# Patient Record
Sex: MALE | Race: WHITE | Employment: UNEMPLOYED | URBAN - METROPOLITAN AREA
[De-identification: names, ages, dates, MRNs, and addresses within clinical notes are randomized per-mention and may not be internally consistent; named-entity substitution may affect disease eponyms.]

---

## 2020-12-28 ENCOUNTER — TELEPHONE (OUTPATIENT)
Dept: FAMILY MEDICINE CLINIC | Age: 50
End: 2020-12-28

## 2020-12-28 NOTE — TELEPHONE ENCOUNTER
----- Message from Cori Rodriguez sent at 12/28/2020 10:14 AM EST -----  Subject: Appointment Request    Reason for Call: New Patient Request Appointment    QUESTIONS  Type of Appointment? New Patient/New to Provider  Reason for appointment request? No appointments available during search  Additional Information for Provider? Pt is requesting to establish care   with Dr. Rodney Cruz. Pt's wife? Agustin Rose is also a patient under Dr. Rodney Cruz. Pt has open availability for appointments. ---------------------------------------------------------------------------  --------------  Livia EARLY  What is the best way for the office to contact you? OK to leave message on   voicemail  Preferred Call Back Phone Number? [phone]  ---------------------------------------------------------------------------  --------------  SCRIPT ANSWERS  Relationship to Patient? Other  Representative Name? Marc  Additional information verified (besides Name and Date of Birth)? Last 4   SSN  Appointment reason? Establish Care/Find a provider  Have you been diagnosed with   tested for   or told that you are suspected of having COVID-19 (Coronavirus)? No  Have you had a fever or taken medication to treat a fever within the past   3 days? No  Have you had a cough   shortness of breath or flu-like symptoms within the past 3 days? No  Do you currently have flu-like symptoms including fever or chills   cough   shortness of breath   or difficulty breathing   or new loss of taste or smell? No  (Service Expert  click yes below to proceed with [employer] As Usual   Scheduling)?  Yes

## 2021-08-02 ENCOUNTER — TELEPHONE (OUTPATIENT)
Dept: FAMILY MEDICINE CLINIC | Age: 51
End: 2021-08-02

## 2021-08-02 NOTE — TELEPHONE ENCOUNTER
----- Message from Michele s sent at 7/31/2021 10:58 AM EDT -----  Subject: Appointment Request    Reason for Call: New Patient Request Appointment    QUESTIONS  Type of Appointment? Established Patient  Reason for appointment request? No appointments available during search  Additional Information for Provider? Ashish Angeles had to cancel his   8/11 due to another appt. No available appts were available. Pls call to   schedule an appt. Appt was for diabetes check up.   ---------------------------------------------------------------------------  --------------  CALL BACK INFO  What is the best way for the office to contact you? OK to leave message on   voicemail  Preferred Call Back Phone Number? [phone]  ---------------------------------------------------------------------------  --------------  SCRIPT ANSWERS  Relationship to Patient? Other  Representative Name? byron  Additional information verified (besides Name and Date of Birth)? Address  Have your symptoms changed? No  Have you been diagnosed with, awaiting test results for, or told that you   are suspected of having COVID-19 (Coronavirus)? (If patient has tested   negative or was tested as a requirement for work, school, or travel and   not based on symptoms, answer no)? No  Do you currently have flu-like symptoms including fever or chills, cough,   shortness of breath, difficulty breathing, or new loss of taste or smell? No  Have you had close contact with someone with COVID-19 in the last 14 days? No  (Service Expert  click yes below to proceed with [employer] As Usual   Scheduling)?  Yes

## 2021-08-11 ENCOUNTER — OFFICE VISIT (OUTPATIENT)
Dept: FAMILY MEDICINE CLINIC | Age: 51
End: 2021-08-11
Payer: COMMERCIAL

## 2021-08-11 VITALS
DIASTOLIC BLOOD PRESSURE: 98 MMHG | WEIGHT: 315 LBS | SYSTOLIC BLOOD PRESSURE: 160 MMHG | TEMPERATURE: 97 F | BODY MASS INDEX: 44.15 KG/M2

## 2021-08-11 DIAGNOSIS — R20.2 PARESTHESIA OF FOOT, BILATERAL: ICD-10-CM

## 2021-08-11 DIAGNOSIS — R03.0 BLOOD PRESSURE ELEVATED WITHOUT HISTORY OF HTN: ICD-10-CM

## 2021-08-11 DIAGNOSIS — Z13.220 SCREENING FOR LIPID DISORDERS: ICD-10-CM

## 2021-08-11 DIAGNOSIS — B35.1 ONYCHOMYCOSIS: ICD-10-CM

## 2021-08-11 DIAGNOSIS — R73.9 HYPERGLYCEMIA: Primary | ICD-10-CM

## 2021-08-11 PROCEDURE — G8427 DOCREV CUR MEDS BY ELIG CLIN: HCPCS | Performed by: FAMILY MEDICINE

## 2021-08-11 PROCEDURE — 99204 OFFICE O/P NEW MOD 45 MIN: CPT | Performed by: FAMILY MEDICINE

## 2021-08-11 PROCEDURE — 3017F COLORECTAL CA SCREEN DOC REV: CPT | Performed by: FAMILY MEDICINE

## 2021-08-11 PROCEDURE — 4004F PT TOBACCO SCREEN RCVD TLK: CPT | Performed by: FAMILY MEDICINE

## 2021-08-11 PROCEDURE — G8419 CALC BMI OUT NRM PARAM NOF/U: HCPCS | Performed by: FAMILY MEDICINE

## 2021-08-11 RX ORDER — TERBINAFINE HYDROCHLORIDE 250 MG/1
250 TABLET ORAL DAILY
Qty: 84 TABLET | Refills: 0 | Status: SHIPPED | OUTPATIENT
Start: 2021-08-11 | End: 2021-10-01

## 2021-08-11 ASSESSMENT — ENCOUNTER SYMPTOMS
BACK PAIN: 1
DIARRHEA: 0
CHEST TIGHTNESS: 0
CONSTIPATION: 0
RHINORRHEA: 0
SHORTNESS OF BREATH: 1

## 2021-08-11 NOTE — PROGRESS NOTES
SUBJECTIVE:    Amparo Noel is a 48 y.o. male who presents as a new patient. Chief Complaint   Patient presents with    Foot Pain     Bilateral foot pain neuropathy for the past 2 months. Concerned he might have diabetes. Foot Pain   The pain is present in the left toes and right toes. This is a chronic problem. The current episode started more than 1 month ago. There has been no history of extremity trauma. The problem has been waxing and waning. The quality of the pain is described as sharp. The pain is moderate. Associated symptoms include numbness ( in base of toes but sharp pain at tips). Pertinent negatives include no limited range of motion. He has tried nothing for the symptoms. Fatigue  This is a chronic problem. The current episode started more than 1 month ago. The problem occurs daily. The problem has been waxing and waning. Associated symptoms include fatigue and numbness ( in base of toes but sharp pain at tips). Pertinent negatives include no chest pain or congestion. The symptoms are aggravated by eating. He has tried rest for the symptoms. The treatment provided mild relief. Past Medical History:   Diagnosis Date    Chronic back pain     Obesity      No past surgical history on file. Family History   Problem Relation Age of Onset    Diabetes Maternal Grandmother     Diabetes Father          in early 62s, ? cause     Social History     Tobacco Use    Smoking status: Current Every Day Smoker     Packs/day: 1.00    Smokeless tobacco: Never Used   Substance Use Topics    Alcohol use: No      No Known Allergies  No current outpatient medications on file prior to visit. No current facility-administered medications on file prior to visit. Review of Systems   Constitutional: Positive for fatigue. HENT: Negative for congestion, postnasal drip and rhinorrhea. Respiratory: Positive for shortness of breath. Negative for chest tightness.     Cardiovascular: Positive for leg swelling. Negative for chest pain and palpitations. Gastrointestinal: Negative for constipation and diarrhea. Genitourinary: Negative for difficulty urinating. Musculoskeletal: Positive for back pain (L1 fracture). Neurological: Positive for numbness ( in base of toes but sharp pain at tips). Psychiatric/Behavioral: Negative for dysphoric mood and sleep disturbance. The patient is not nervous/anxious. OBJECTIVE:    BP (!) 160/98   Temp 97 °F (36.1 °C)   Wt (!) 321 lb (145.6 kg)   BMI 44.15 kg/m²    Vitals:    08/11/21 1518 08/11/21 1540   BP: (!) 148/90 (!) 160/98   Temp: 97 °F (36.1 °C)    Weight: (!) 321 lb (145.6 kg)        Physical Exam  Constitutional:       Appearance: He is well-developed. He is obese. HENT:      Head: Normocephalic and atraumatic. Right Ear: External ear normal.      Left Ear: External ear normal.      Nose: Nose normal.   Eyes:      General:         Right eye: No discharge. Conjunctiva/sclera: Conjunctivae normal.   Neck:      Thyroid: No thyromegaly. Vascular: No JVD. Trachea: No tracheal deviation. Cardiovascular:      Rate and Rhythm: Normal rate and regular rhythm. Heart sounds: Normal heart sounds. Pulmonary:      Effort: Pulmonary effort is normal. No respiratory distress. Breath sounds: Normal breath sounds. No rales. Musculoskeletal:      Cervical back: Normal range of motion and neck supple. Right lower leg: Edema present. Left lower leg: Edema present. Lymphadenopathy:      Cervical: No cervical adenopathy. Skin:     General: Skin is warm and dry. Neurological:      Mental Status: He is alert and oriented to person, place, and time. Psychiatric:         Mood and Affect: Mood normal.         Behavior: Behavior normal.         ASSESSMENT/PLAN:    Jeremy ISAACS was seen today for foot pain.     Diagnoses and all orders for this visit:    Hyperglycemia  Patient is concerned that he may have diabetes with his pain and numbness in his feet and toes. Apparently there is a strong family history of diabetes and patient is morbidly obese. -     Hemoglobin A1C; Future    Screening for lipid disorders  -     CBC Auto Differential; Future  -     Comprehensive Metabolic Panel, Fasting; Future  -     Lipid, Fasting; Future  -     TSH with Reflex; Future    Onychomycosis  -     terbinafine (LAMISIL) 250 MG tablet; Take 1 tablet by mouth daily    Paresthesia of foot, bilateral  -     Hawa Alvarez MD (Inpatient and Outpatient EMG), [facility]  -     Vitamin B12 & Folate; Future    Blood pressure elevated without history of HTN  Have asked patient to obtain a blood pressure monitor and start checking his readings. He will be return in 4 weeks for recheck. He should bring his monitor and log of readings at that time. Due to the edema of his lower extremities will add a diuretic to his blood pressure medications if started at next visit. He is encouraged to try to lose weight. Return in about 4 weeks (around 9/8/2021). Please note portions of this note were completed with a voice recognition program.  Efforts were made to edit the dictations but occasionally words are mis-transcribed.

## 2021-08-17 ENCOUNTER — TELEPHONE (OUTPATIENT)
Dept: FAMILY MEDICINE CLINIC | Age: 51
End: 2021-08-17

## 2021-08-17 NOTE — TELEPHONE ENCOUNTER
----- Message from Elsie Mosley sent at 8/17/2021  2:05 PM EDT -----  Subject: Results Request    QUESTIONS  Which lab or imaging result is the patient calling about? BLOOD WORK   Which provider ordered the test? Malia Rojas. At what location was the test performed? [facility]  Date the test was performed? 2021-08-11  Additional Information for Provider? His blood pressure is pretty high, so   it's very important for him to be contacted. Thank you.   ---------------------------------------------------------------------------  --------------  CALL BACK INFO  What is the best way for the office to contact you? OK to leave message on   voicemail  Preferred Call Back Phone Number?  [phone]

## 2021-08-18 ENCOUNTER — PROCEDURE VISIT (OUTPATIENT)
Dept: NEUROLOGY | Age: 51
End: 2021-08-18
Payer: COMMERCIAL

## 2021-08-18 DIAGNOSIS — R20.0 BILATERAL LEG NUMBNESS: Primary | ICD-10-CM

## 2021-08-18 PROCEDURE — 95886 MUSC TEST DONE W/N TEST COMP: CPT | Performed by: PSYCHIATRY & NEUROLOGY

## 2021-08-18 PROCEDURE — 95910 NRV CNDJ TEST 7-8 STUDIES: CPT | Performed by: PSYCHIATRY & NEUROLOGY

## 2021-08-18 NOTE — PROGRESS NOTES
Christa Rodriguez M.D. [facility]) Physicians/[facility]  Board Certified in [address] [facility] [address] [facility], [address]    EMG / NERVE CONDUCTION STUDY      PATIENT:  Alicia Rueda       DATE OF EM21     YOB: 1970       REASON FOR EMG:   Numbness in both feet      REFERRING PHYSICIAN:  Jed Blanco MD  [address],  [address] [place] [address]     SUMMARY:   Bilateral peroneal motor nerve studies had slightly low conduction velocities  The right posterior tibial motor had a normal amplitude and slow conduction velocity  The left peroneal motor nerve study had a low amplitude and slow conduction velocity  Bilateral sural sensory nerve studies were normal.  However the right superficial peroneal sensory nerve study was not recordable. Needle EMG of several muscles in both lower extremities was normal.      CLINICAL DIAGNOSIS:  Peripheral neuropathy        EMG RESULTS:   This patient has a mild generalized sensorimotor mixed polyneuropathy in both lower extremities         ---------------------------------------------  Christa Rodriguez M.D.   Electromyographer / Neurologist

## 2021-08-24 RX ORDER — GABAPENTIN 100 MG/1
100 CAPSULE ORAL 3 TIMES DAILY
Qty: 90 CAPSULE | Refills: 0 | Status: SHIPPED | OUTPATIENT
Start: 2021-08-24 | End: 2021-10-01 | Stop reason: SDUPTHER

## 2021-09-10 ENCOUNTER — TELEPHONE (OUTPATIENT)
Dept: FAMILY MEDICINE CLINIC | Age: 51
End: 2021-09-10

## 2021-09-10 NOTE — TELEPHONE ENCOUNTER
----- Message from [facility] sent at 9/9/2021 10:45 AM EDT -----  Subject: Appointment Request    Reason for Call: Routine Existing Condition Follow Up    QUESTIONS  Type of Appointment? Established Patient  Reason for appointment request? No appointments available during search  Additional Information for Provider? juana has a 9/9/2021 and is needing   to reschedule. its for a 4 week f/u but the nest soonest availibility isnt   until 10/14/2021. please follow up with patient to reschedule   ---------------------------------------------------------------------------  --------------  [address]  What is the best way for the office to contact you? OK to leave message on   voicemail  Preferred Call Back Phone Number? [phone]  ---------------------------------------------------------------------------  --------------  SCRIPT ANSWERS  Relationship to Patient? Self  Have your symptoms changed? No  (Is the patient requesting to be seen urgently for their symptoms?)? No  Is this follow up request related to routine Diabetes Management? No  Are you having any new concerns about your existing condition? No  Have you been diagnosed with, awaiting test results for, or told that you   are suspected of having COVID-19 (Coronavirus)? (If patient has tested   negative or was tested as a requirement for work, school, or travel and   not based on symptoms, answer no)? No  Do you currently have flu-like symptoms including fever or chills, cough,   shortness of breath, difficulty breathing, or new loss of taste or smell? No  Have you had close contact with someone with COVID-19 in the last 14 days? No  (Service Expert  click yes below to proceed with [employer] As Usual   Scheduling)?  Yes

## 2021-09-22 ENCOUNTER — NURSE TRIAGE (OUTPATIENT)
Dept: OTHER | Facility: CLINIC | Age: 51
End: 2021-09-22

## 2021-09-22 NOTE — TELEPHONE ENCOUNTER
Received call from Vanna Puente at [facility] with Red Flag Complaint. Brief description of triage:   Back pain    Triage indicates for patient to go to the ED  Patient would still like a follow up with PCP scheduled  Care advice provided, patient verbalizes understanding; denies any other questions or concerns; instructed to call back for any new or worsening symptoms. Writer provided warm transfer to [facility] for appointment scheduling. Attention Provider: Thank you for allowing me to participate in the care of your patient. The patient was connected to triage in response to information provided to the [facility]. Please do not respond through this encounter as the response is not directed to a shared pool. Reason for Disposition   [1] SEVERE back pain (e.g., excruciating) AND [2] sudden onset AND [3] age > 61    Answer Assessment - Initial Assessment Questions  1. ONSET: \"When did the pain begin? \"       Two days ago    2. LOCATION: \"Where does it hurt? \" (upper, mid or lower back)      Mid to lower    3. SEVERITY: \"How bad is the pain? \"  (e.g., Scale 1-10; mild, moderate, or severe)    - MILD (1-3): doesn't interfere with normal activities     - MODERATE (4-7): interferes with normal activities or awakens from sleep     - SEVERE (8-10): excruciating pain, unable to do any normal activities       10    4. PATTERN: \"Is the pain constant? \" (e.g., yes, no; constant, intermittent)       Constant    5. RADIATION: \"Does the pain shoot into your legs or elsewhere? \"      Shoots into his legs    6. CAUSE:  \"What do you think is causing the back pain? \"       Wonders if it is from where he broke his back before    7. BACK OVERUSE:  Elijah Dickey recent lifting of heavy objects, strenuous work or exercise? \"      No    8. MEDICATIONS: \"What have you taken so far for the pain? \" (e.g., nothing, acetaminophen, NSAIDS)      Celebrex, Advil     9.  NEUROLOGIC SYMPTOMS: \"Do you have any weakness, numbness, or problems with bowel/bladder control? \"      No    10. OTHER SYMPTOMS: \"Do you have any other symptoms? \" (e.g., fever, abdominal pain, burning with urination, blood in urine)        No    11. PREGNANCY: \"Is there any chance you are pregnant? \" (e.g., yes, no; LMP)        n/a    Protocols used: BACK PAIN-ADULT-AH

## 2021-09-23 ENCOUNTER — APPOINTMENT (OUTPATIENT)
Dept: CT IMAGING | Age: 51
End: 2021-09-23
Payer: COMMERCIAL

## 2021-09-23 ENCOUNTER — HOSPITAL ENCOUNTER (EMERGENCY)
Age: 51
Discharge: HOME | End: 2021-09-23
Payer: COMMERCIAL

## 2021-09-23 VITALS
TEMPERATURE: 98.7 F | OXYGEN SATURATION: 95 % | HEART RATE: 63 BPM | SYSTOLIC BLOOD PRESSURE: 139 MMHG | DIASTOLIC BLOOD PRESSURE: 78 MMHG | RESPIRATION RATE: 18 BRPM

## 2021-09-23 DIAGNOSIS — N20.0 KIDNEY STONE: Primary | ICD-10-CM

## 2021-09-23 DIAGNOSIS — R10.9 FLANK PAIN: ICD-10-CM

## 2021-09-23 LAB
A/G RATIO: 1.3 (ref 1.1–2.2)
ALBUMIN SERPL-MCNC: 4 G/DL (ref 3.4–5)
ALP BLD-CCNC: 92 U/L (ref 40–129)
ALT SERPL-CCNC: 55 U/L (ref 10–40)
ANION GAP SERPL CALCULATED.3IONS-SCNC: 10 MMOL/L (ref 3–16)
AST SERPL-CCNC: 32 U/L (ref 15–37)
BASOPHILS ABSOLUTE: 0.1 K/UL (ref 0–0.2)
BASOPHILS RELATIVE PERCENT: 1 %
BILIRUB SERPL-MCNC: 0.4 MG/DL (ref 0–1)
BILIRUBIN URINE: NEGATIVE
BLOOD, URINE: ABNORMAL
BUN BLDV-MCNC: 16 MG/DL (ref 7–20)
CALCIUM SERPL-MCNC: 9 MG/DL (ref 8.3–10.6)
CHLORIDE BLD-SCNC: 100 MMOL/L (ref 99–110)
CLARITY: CLEAR
CO2: 26 MMOL/L (ref 21–32)
COLOR: ABNORMAL
CREAT SERPL-MCNC: 0.8 MG/DL (ref 0.9–1.3)
EOSINOPHILS ABSOLUTE: 0.2 K/UL (ref 0–0.6)
EOSINOPHILS RELATIVE PERCENT: 1.4 %
EPITHELIAL CELLS, UA: 1 /HPF (ref 0–5)
GFR AFRICAN AMERICAN: >60
GFR NON-AFRICAN AMERICAN: >60
GLOBULIN: 3.2 G/DL
GLUCOSE BLD-MCNC: 222 MG/DL (ref 70–99)
GLUCOSE URINE: NEGATIVE MG/DL
HCT VFR BLD CALC: 46.9 % (ref 40.5–52.5)
HEMOGLOBIN: 15.9 G/DL (ref 13.5–17.5)
HYALINE CASTS: 3 /LPF (ref 0–8)
KETONES, URINE: NEGATIVE MG/DL
LEUKOCYTE ESTERASE, URINE: NEGATIVE
LIPASE: 39 U/L (ref 13–60)
LYMPHOCYTES ABSOLUTE: 2.6 K/UL (ref 1–5.1)
LYMPHOCYTES RELATIVE PERCENT: 20 %
MCH RBC QN AUTO: 29.2 PG (ref 26–34)
MCHC RBC AUTO-ENTMCNC: 34 G/DL (ref 31–36)
MCV RBC AUTO: 85.8 FL (ref 80–100)
MICROSCOPIC EXAMINATION: YES
MONOCYTES ABSOLUTE: 0.8 K/UL (ref 0–1.3)
MONOCYTES RELATIVE PERCENT: 6.4 %
NEUTROPHILS ABSOLUTE: 9.4 K/UL (ref 1.7–7.7)
NEUTROPHILS RELATIVE PERCENT: 71.2 %
NITRITE, URINE: NEGATIVE
PDW BLD-RTO: 19.7 % (ref 12.4–15.4)
PH UA: 5.5 (ref 5–8)
PLATELET # BLD: 210 K/UL (ref 135–450)
PMV BLD AUTO: 9.7 FL (ref 5–10.5)
POTASSIUM SERPL-SCNC: 3.7 MMOL/L (ref 3.5–5.1)
PROTEIN UA: NEGATIVE MG/DL
RBC # BLD: 5.47 M/UL (ref 4.2–5.9)
RBC UA: 43 /HPF (ref 0–4)
SODIUM BLD-SCNC: 136 MMOL/L (ref 136–145)
SPECIFIC GRAVITY UA: 1.02 (ref 1–1.03)
TOTAL PROTEIN: 7.2 G/DL (ref 6.4–8.2)
URINE REFLEX TO CULTURE: ABNORMAL
URINE TYPE: ABNORMAL
UROBILINOGEN, URINE: 0.2 E.U./DL
WBC # BLD: 13.1 K/UL (ref 4–11)
WBC UA: 3 /HPF (ref 0–5)

## 2021-09-23 PROCEDURE — 36415 COLL VENOUS BLD VENIPUNCTURE: CPT

## 2021-09-23 PROCEDURE — 83690 ASSAY OF LIPASE: CPT

## 2021-09-23 PROCEDURE — 96374 THER/PROPH/DIAG INJ IV PUSH: CPT

## 2021-09-23 PROCEDURE — 74176 CT ABD & PELVIS W/O CONTRAST: CPT

## 2021-09-23 PROCEDURE — 81001 URINALYSIS AUTO W/SCOPE: CPT

## 2021-09-23 PROCEDURE — 85025 COMPLETE CBC W/AUTO DIFF WBC: CPT

## 2021-09-23 PROCEDURE — 6360000002 HC RX W HCPCS: Performed by: NURSE PRACTITIONER

## 2021-09-23 PROCEDURE — 80053 COMPREHEN METABOLIC PANEL: CPT

## 2021-09-23 PROCEDURE — 99281 EMR DPT VST MAYX REQ PHY/QHP: CPT

## 2021-09-23 RX ORDER — KETOROLAC TROMETHAMINE 30 MG/ML
15 INJECTION, SOLUTION INTRAMUSCULAR; INTRAVENOUS ONCE
Status: COMPLETED | OUTPATIENT
Start: 2021-09-23 | End: 2021-09-23

## 2021-09-23 RX ADMIN — KETOROLAC TROMETHAMINE 15 MG: 30 INJECTION, SOLUTION INTRAMUSCULAR at 02:21

## 2021-09-23 ASSESSMENT — ENCOUNTER SYMPTOMS
DIARRHEA: 0
NAUSEA: 0
SHORTNESS OF BREATH: 0
ABDOMINAL PAIN: 0
VOMITING: 0
CHEST TIGHTNESS: 0

## 2021-09-23 ASSESSMENT — PAIN SCALES - GENERAL: PAINLEVEL_OUTOF10: 8

## 2021-09-23 NOTE — ED TRIAGE NOTES
Pt states he is having severe pain in his back that comes around to the front into his abdomen. States this started a day ago but has gotten much worse.  Has also been nauseous

## 2021-09-23 NOTE — ED NOTES
Pt rating pain an 8/10, states he has had back issues before but that this feels much different.      Ruthie Braxton, RN  09/23/21 27 Juan Jose Rollins RN  09/23/21 4520

## 2021-09-23 NOTE — ED PROVIDER NOTES
results are displayed. All other labs were within normal range or not returned as of this dictation. EKG: When ordered, EKG's are interpreted by the Emergency Department Physician in the absence of a cardiologist.  Please see their note for interpretation of EKG. RADIOLOGY:   Non-plain film images such as CT, Ultrasound and MRI are read by the radiologist. Plain radiographic images are visualized and preliminarily interpreted by the ED Provider with the below findings:        Interpretation per the Radiologist below, if available at the time of this note:    CT ABDOMEN PELVIS WO CONTRAST Additional Contrast? None   Final Result   Tiny bladder calculus with no hydronephrosis. There are are also bilateral   intrarenal calculi. No results found. PROCEDURES   Unless otherwise noted below, none     Procedures    CRITICAL CARE TIME   N/A    CONSULTS:  None      EMERGENCY DEPARTMENT COURSE and DIFFERENTIAL DIAGNOSIS/MDM:   Vitals:    Vitals:    09/23/21 0149   BP: 139/78   Pulse: 63   Resp: 18   Temp: 98.7 °F (37.1 °C)   TempSrc: Oral   SpO2: 95%       Patient was given the following medications:  Medications   ketorolac (TORADOL) injection 15 mg (15 mg IntraVENous Given 9/23/21 0221)           Briefly, this is a 48year old male that presents to the emergency department with right-sided flank pain. Patient reports that the pain is severe. No mitigating exacerbating factors. Toradol was ordered with blood work and a CT to rule out acute abnormality. Urinalysis does reveal large blood. No signs of infection. CBC shows a leukocytosis white count of 13.1. CMP is unremarkable. Normal lipase. CT ABDOMEN PELVIS WO CONTRAST Additional Contrast? None (Final result)  Result time 09/23/21 02:41:31  Final result by Greg Valdivia MD (09/23/21 02:41:31)                Impression:    Tiny bladder calculus with no hydronephrosis. [place] are are also bilateral   intrarenal calculi. Does appear that the patient has passed a kidney stone. He is instructed on close outpatient follow-up and strict return precautions. Toradol was given in the ER and the patient is feeling much better. Plan to discharge home with instructions for NSAID use. I see nothing that would suggest an acute abdomen at this time. Based on history, physical exam, risk factors, and tests my suspicion for bowel obstruction, incarcerated hernia, acute pancreatitis, intra-abdominal abscess, perforated viscus, diverticulitis, cholecystitis, appendicitis, testicular torsion and cardiac ischemia is very low. There is no evidence of peritonitis, sepsis or toxicity at this time. I feel the patient can be managed as an outpatient with follow-up with his family doctor in 24-48 hours. Instructions have been given for the patient to return to the emergency department for worsening of the pain, high fevers, intractable vomiting, bleeding or any other concerns    FINAL IMPRESSION      1. Kidney stone    2.  Flank pain          DISPOSITION/PLAN   DISPOSITION Decision To Discharge 09/23/2021 03:32:34 AM      PATIENT REFERRED TO:  Stan Tony MD  [address]  [phone]    Schedule an appointment as soon as possible for a visit       Leslie Breaux MD  [facility]. [phone]    Schedule an appointment as soon as possible for a visit         DISCHARGE MEDICATIONS:  Discharge Medication List as of 9/23/2021  3:33 AM          DISCONTINUED MEDICATIONS:  Discharge Medication List as of 9/23/2021  3:33 AM                 (Please note that portions of this note were completed with a voice recognition program.  Efforts were made to edit the dictations but occasionally words are mis-transcribed.)    MARTHA Lea CNP (electronically signed)           MARTHA Lea CNP  09/23/21 1367

## 2021-10-01 ENCOUNTER — OFFICE VISIT (OUTPATIENT)
Dept: FAMILY MEDICINE CLINIC | Age: 51
End: 2021-10-01
Payer: COMMERCIAL

## 2021-10-01 VITALS
TEMPERATURE: 97.3 F | BODY MASS INDEX: 45.11 KG/M2 | WEIGHT: 315 LBS | DIASTOLIC BLOOD PRESSURE: 86 MMHG | SYSTOLIC BLOOD PRESSURE: 130 MMHG

## 2021-10-01 DIAGNOSIS — E66.01 OBESITY, MORBID (MORE THAN 100 LBS OVER IDEAL WEIGHT OR BMI > 40) (HCC): Primary | ICD-10-CM

## 2021-10-01 DIAGNOSIS — G62.9 PERIPHERAL POLYNEUROPATHY: ICD-10-CM

## 2021-10-01 DIAGNOSIS — R73.9 HYPERGLYCEMIA: ICD-10-CM

## 2021-10-01 DIAGNOSIS — G89.29 CHRONIC MIDLINE LOW BACK PAIN WITH SCIATICA, SCIATICA LATERALITY UNSPECIFIED: ICD-10-CM

## 2021-10-01 DIAGNOSIS — M54.40 CHRONIC MIDLINE LOW BACK PAIN WITH SCIATICA, SCIATICA LATERALITY UNSPECIFIED: ICD-10-CM

## 2021-10-01 DIAGNOSIS — E78.1 PURE HYPERTRIGLYCERIDEMIA: ICD-10-CM

## 2021-10-01 PROCEDURE — G8427 DOCREV CUR MEDS BY ELIG CLIN: HCPCS | Performed by: FAMILY MEDICINE

## 2021-10-01 PROCEDURE — G8484 FLU IMMUNIZE NO ADMIN: HCPCS | Performed by: FAMILY MEDICINE

## 2021-10-01 PROCEDURE — 4004F PT TOBACCO SCREEN RCVD TLK: CPT | Performed by: FAMILY MEDICINE

## 2021-10-01 PROCEDURE — G8419 CALC BMI OUT NRM PARAM NOF/U: HCPCS | Performed by: FAMILY MEDICINE

## 2021-10-01 PROCEDURE — 99213 OFFICE O/P EST LOW 20 MIN: CPT | Performed by: FAMILY MEDICINE

## 2021-10-01 PROCEDURE — 3017F COLORECTAL CA SCREEN DOC REV: CPT | Performed by: FAMILY MEDICINE

## 2021-10-01 RX ORDER — GABAPENTIN 300 MG/1
300 CAPSULE ORAL NIGHTLY
Qty: 90 CAPSULE | Refills: 1 | Status: SHIPPED | OUTPATIENT
Start: 2021-10-01 | End: 2021-12-21

## 2021-10-01 RX ORDER — GABAPENTIN 100 MG/1
100 CAPSULE ORAL 3 TIMES DAILY
Qty: 90 CAPSULE | Refills: 3 | Status: SHIPPED | OUTPATIENT
Start: 2021-10-01 | End: 2021-12-21

## 2021-10-01 ASSESSMENT — ENCOUNTER SYMPTOMS
RHINORRHEA: 0
CHEST TIGHTNESS: 0
COUGH: 0
BACK PAIN: 1
DIARRHEA: 0
SHORTNESS OF BREATH: 0
CONSTIPATION: 0

## 2021-10-01 NOTE — PROGRESS NOTES
SUBJECTIVE:    Lul Poe is a 48 y.o. male who presents for a follow up visit. Chief Complaint   Patient presents with    Follow-up     Patient is here for a follow up. Discuss lab. No new concerns. Hyperlipidemia  This is a chronic problem. The current episode started more than 1 year ago. Lipid results: Total cholesterol 132, triglycerides 287, HDL 18, LDL 57. Exacerbating diseases include obesity. Pertinent negatives include no chest pain or shortness of breath. He is currently on no antihyperlipidemic treatment. Compliance problems include adherence to diet and adherence to exercise. Risk factors for coronary artery disease include dyslipidemia, male sex, obesity and a sedentary lifestyle. Patient's medications, allergies, past medical,surgical, social and family histories were reviewed and updated as appropriate. Past Medical History:   Diagnosis Date    Chronic back pain     Hyperlipidemia     Neuropathy     Obesity      No past surgical history on file. Family History   Problem Relation Age of Onset    Diabetes Maternal Grandmother     Diabetes Father          in early 62s, ? cause     Social History     Tobacco Use    Smoking status: Current Every Day Smoker     Packs/day: 1.00    Smokeless tobacco: Never Used   Substance Use Topics    Alcohol use: No      No Known Allergies  No current outpatient medications on file prior to visit. No current facility-administered medications on file prior to visit. Review of Systems   Constitutional: Negative for activity change, fatigue and unexpected weight change. HENT: Negative for congestion, postnasal drip and rhinorrhea. Respiratory: Negative for cough, chest tightness and shortness of breath. Cardiovascular: Negative for chest pain. Gastrointestinal: Negative for constipation and diarrhea. Genitourinary: Negative for difficulty urinating. Musculoskeletal: Positive for back pain. Psychiatric/Behavioral: Negative for sleep disturbance. OBJECTIVE:    /86   Temp 97.3 °F (36.3 °C)   Wt (!) 328 lb (148.8 kg)   BMI 45.11 kg/m²    Physical Exam  Constitutional:       Appearance: He is well-developed. He is obese. HENT:      Head: Normocephalic and atraumatic. Right Ear: Tympanic membrane and external ear normal.      Left Ear: Tympanic membrane and external ear normal.      Nose: Nose normal.      Mouth/Throat:      Mouth: Mucous membranes are moist.      Pharynx: No posterior oropharyngeal erythema. Eyes:      General:         Right eye: No discharge. Conjunctiva/sclera: Conjunctivae normal.   Neck:      Thyroid: No thyromegaly. Vascular: No JVD. Trachea: No tracheal deviation. Cardiovascular:      Rate and Rhythm: Normal rate and regular rhythm. Heart sounds: Normal heart sounds. Pulmonary:      Effort: Pulmonary effort is normal. No respiratory distress. Breath sounds: Normal breath sounds. No rales. Musculoskeletal:      Cervical back: Normal range of motion and neck supple. Lymphadenopathy:      Cervical: No cervical adenopathy. Skin:     General: Skin is warm and dry. Neurological:      Mental Status: He is alert and oriented to person, place, and time. Psychiatric:         Mood and Affect: Mood normal.         Behavior: Behavior normal.         ASSESSMENT/PLAN:    Adam Carmona was seen today for follow-up. Diagnoses and all orders for this visit:    Obesity, morbid (more than 100 lbs over ideal weight or BMI > 40) ([facility])  Today's BMI is 45.11 kg/m²    Peripheral polyneuropathy  Symptoms are improved with the start of gabapentin. We will go ahead and try to increase the dose.  -     gabapentin (NEURONTIN) 100 MG capsule; Take 1 capsule by mouth 3 times daily for 30 days. Intended supply: 90 days  -     gabapentin (NEURONTIN) 300 MG capsule; Take 1 capsule by mouth nightly for 90 days.     Pure hypertriglyceridemia  - Comprehensive Metabolic Panel, Fasting; Future  -     Lipid, Fasting; Future    Hyperglycemia  Hemoglobin A1c in August was 6.3  -     Hemoglobin A1C; Future    Chronic midline low back pain with sciatica, sciatica laterality unspecified  -     gabapentin (NEURONTIN) 300 MG capsule; Take 1 capsule by mouth nightly for 90 days. Return in about 6 months (around 4/1/2022) for follow up of hyperlipidemia. Please note portions of this note were completed with a voicerecognition program.  Efforts were made to edit the dictations but occasionally words are mis-transcribed.

## 2021-10-01 NOTE — PATIENT INSTRUCTIONS
Patient Education        Learning About Carbohydrate (Carb) Counting and Eating Out When You Have Diabetes  Why plan your meals? Meal planning can be a key part of managing diabetes. Planning meals and snacks with the right balance of carbohydrate, protein, and fat can help you keep your blood sugar at the target level you set with your doctor. You don't have to eat special foods. You can eat what your family eats, including sweets once in a while. But you do have to pay attention to how often you eat and how much you eat of certain foods. You may want to work with a dietitian or a certified diabetes educator. He or she can give you tips and meal ideas and can answer your questions about meal planning. This health professional can also help you reach a healthy weight if that is one of your goals. What should you know about eating carbs? Managing the amount of carbohydrate (carbs) you eat is an important part of healthy meals when you have diabetes. Carbohydrate is found in many foods. · Learn which foods have carbs. And learn the amounts of carbs in different foods. ? Bread, cereal, pasta, and rice have about 15 grams of carbs in a serving. A serving is 1 slice of bread (1 ounce), ½ cup of cooked cereal, or 1/3 cup of cooked pasta or rice. ? Fruits have 15 grams of carbs in a serving. A serving is 1 small fresh fruit, such as an apple or orange; ½ of a banana; ½ cup of cooked or canned fruit; ½ cup of fruit juice; 1 cup of melon or raspberries; or 2 tablespoons of dried fruit. ? Milk and no-sugar-added yogurt have 15 grams of carbs in a serving. A serving is 1 cup of milk or 2/3 cup of no-sugar-added yogurt. ? Starchy vegetables have 15 grams of carbs in a serving. A serving is ½ cup of mashed potatoes or sweet potato; 1 cup winter squash; ½ of a small baked potato; ½ cup of cooked beans; or ½ cup cooked corn or green peas.   · Learn how much carbs to eat each day and at each meal. A dietitian or CDE can teach you how to keep track of the amount of carbs you eat. This is called carbohydrate counting. · If you are not sure how to count carbohydrate grams, use the Plate Method to plan meals. It is a good, quick way to make sure that you have a balanced meal. It also helps you spread carbs throughout the day. ? Divide your plate by types of foods. Put non-starchy vegetables on half the plate, meat or other protein food on one-quarter of the plate, and a grain or starchy vegetable in the final quarter of the plate. To this you can add a small piece of fruit and 1 cup of milk or yogurt, depending on how many carbs you are supposed to eat at a meal.  · Try to eat about the same amount of carbs at each meal. Do not \"save up\" your daily allowance of carbs to eat at one meal.  · Proteins have very little or no carbs per serving. Examples of proteins are beef, chicken, turkey, fish, eggs, tofu, cheese, cottage cheese, and peanut butter. A serving size of meat is 3 ounces, which is about the size of a deck of cards. Examples of meat substitute serving sizes (equal to 1 ounce of meat) are 1/4 cup of cottage cheese, 1 egg, 1 tablespoon of peanut butter, and ½ cup of tofu. How can you eat out and still eat healthy? · Learn to estimate the serving sizes of foods that have carbohydrate. If you measure food at home, it will be easier to estimate the amount in a serving of restaurant food. · If the meal you order has too much carbohydrate (such as potatoes, corn, or baked beans), ask to have a low-carbohydrate food instead. Ask for a salad or green vegetables. · If you use insulin, check your blood sugar before and after eating out to help you plan how much to eat in the future. · If you eat more carbohydrate at a meal than you had planned, take a walk or do other exercise. This will help lower your blood sugar. What are some tips for eating healthy? · Limit saturated fat, such as the fat from meat and dairy products. This is a healthy choice because people who have diabetes are at higher risk of heart disease. So choose lean cuts of meat and nonfat or low-fat dairy products. Use olive or canola oil instead of butter or shortening when cooking. · Don't skip meals. Your blood sugar may drop too low if you skip meals and take insulin or certain medicines for diabetes. · Check with your doctor before you drink alcohol. Alcohol can cause your blood sugar to drop too low. Alcohol can also cause a bad reaction if you take certain diabetes medicines. Follow-up care is a key part of your treatment and safety. Be sure to make and go to all appointments, and call your doctor if you are having problems. It's also a good idea to know your test results and keep a list of the medicines you take. Where can you learn more? Go to https://AlaMarkacarlie.Speedshape. org and sign in to your [employer] account. Enter X979 in the [employer] box to learn more about \"Learning About Carbohydrate (Carb) Counting and Eating Out When You Have Diabetes. \"     If you do not have an account, please click on the \"Sign Up Now\" link. Current as of: December 17, 2020               Content Version: 13.0  © 2006-2021 Healthwise, [employer]. Care instructions adapted under license by [facility] ([facility]). If you have questions about a medical condition or this instruction, always ask your healthcare professional. [place] [address] any warranty or liability for your use of this information. Patient Education        Counting Carbohydrates for Diabetes: Care Instructions  Your Care Instructions     You don't have to eat special foods when you have diabetes. You just have to be careful to eat healthy foods. Carbohydrates (carbs) raise blood sugar higher and quicker than any other nutrient. Carbs are found in desserts, breads and cereals, and fruit. They're also in starchy vegetables.  These include potatoes, corn, and grains such as rice and pasta. Carbs are also in milk and yogurt. The more carbs you eat at one time, the higher your blood sugar will rise. Spreading carbs all through the day helps keep your blood sugar levels within your target range. Counting carbs is one of the best ways to keep your blood sugar under control. If you use insulin, counting carbs helps you match the right amount of insulin to the number of grams of carbs in a meal. Then you can change your diet and insulin dose as needed. Testing your blood sugar several times a day can help you learn how carbs affect your blood sugar. A registered dietitian or certified diabetes educator can help you plan meals and snacks. Follow-up care is a key part of your treatment and safety. Be sure to make and go to all appointments, and call your doctor if you are having problems. It's also a good idea to know your test results and keep a list of the medicines you take. How can you care for yourself at home? Know your daily amount of carbohydrates  Your daily amount depends on several things, such as your weight, how active you are, which diabetes medicines you take, and what your goals are for your blood sugar levels. A registered dietitian or certified diabetes educator can help you plan how many carbs to include in each meal and snack. For most adults, a guideline for the daily amount of carbs is:  · 45 to 60 grams at each meal. That's about the same as 3 to 4 carbohydrate servings. · 15 to 20 grams at each snack. That's about the same as 1 carbohydrate serving. Count carbs  Counting carbs lets you know how much rapid-acting insulin to take before you eat. If you use an insulin pump, you get a constant rate of insulin during the day. So the pump must be programmed at meals. This gives you extra insulin to cover the rise in blood sugar after meals. If you take insulin:  · Learn your own insulin-to-carb ratio.  You and your diabetes health professional will figure out the ratio. You can do this by testing your blood sugar after meals. For example, you may need a certain amount of insulin for every 15 grams of carbs. · Add up the carb grams in a meal. Then you can figure out how many units of insulin to take based on your insulin-to-carb ratio. · Exercise lowers blood sugar. You can use less insulin than you would if you were not doing exercise. Keep in mind that timing matters. If you exercise within 1 hour after a meal, your body may need less insulin for that meal than it would if you exercised 3 hours after the meal. Test your blood sugar to find out how exercise affects your need for insulin. If you do or don't take insulin:  · Look at labels on packaged foods. This can tell you how many carbs are in a serving. You can also use guides from the American Diabetes Association. · Be aware of portions, or serving sizes. If a package has two servings and you eat the whole package, you need to double the number of grams of carbohydrate listed for one serving. · Protein, fat, and fiber do not raise blood sugar as much as carbs do. If you eat a lot of these nutrients in a meal, your blood sugar will rise more slowly than it would otherwise. Eat from all food groups  · Eat at least three meals a day. · Plan meals to include food from all the food groups. The food groups include grains, fruits, dairy, proteins, and vegetables. · Talk to your dietitian or diabetes educator about ways to add limited amounts of sweets into your meal plan. · If you drink alcohol, talk to your doctor. It may not be recommended when you are taking certain diabetes medicines. Where can you learn more? Go to https://Stadiuscarlie.Fashiontrot. org and sign in to your [employer] account. Enter S011 in the [facility] box to learn more about \"Counting Carbohydrates for Diabetes: Care Instructions. \"     If you do not have an account, please click on the \"Sign Up Now\" link.   Current as of: August 31, 2020               Content Version: 13.0  © 2055-9086 Healthwise, Incorporated. Care instructions adapted under license by [facility] [place] ([facility]). If you have questions about a medical condition or this instruction, always ask your healthcare professional. [address] any warranty or liability for your use of this information.

## 2021-10-04 ENCOUNTER — TELEPHONE (OUTPATIENT)
Dept: FAMILY MEDICINE CLINIC | Age: 51
End: 2021-10-04

## 2021-10-04 NOTE — TELEPHONE ENCOUNTER
----- Message from Charlene Elias 12 sent at 10/2/2021  2:11 PM EDT -----  Subject: Message to Provider    QUESTIONS  Information for Provider? Patient wife is calling in to see if you irving   received patients medical records. She requested them 3 weeks ago to be   sent over to Dr. Cynthia Romero office. Please call her back to advise   ---------------------------------------------------------------------------  --------------  CALL BACK INFO  What is the best way for the office to contact you? OK to leave message on   voicemail  Preferred Call Back Phone Number? [phone]  ---------------------------------------------------------------------------  --------------  SCRIPT ANSWERS  Relationship to Patient? Other  Representative Name? Marc   Is the Representative on the appropriate HIPAA document in Epic?  Yes

## 2021-12-21 ENCOUNTER — TELEPHONE (OUTPATIENT)
Dept: FAMILY MEDICINE CLINIC | Age: 51
End: 2021-12-21

## 2021-12-21 DIAGNOSIS — G62.9 NEUROPATHY: Primary | ICD-10-CM

## 2021-12-21 RX ORDER — GABAPENTIN 300 MG/1
300 CAPSULE ORAL 3 TIMES DAILY
Qty: 180 CAPSULE | Refills: 2 | Status: SHIPPED | OUTPATIENT
Start: 2021-12-21 | End: 2022-02-07 | Stop reason: SDUPTHER

## 2021-12-21 NOTE — TELEPHONE ENCOUNTER
Patient's wife is calling because the patient is in a lot of pain because of his neuropathy and he has been taking his gabapentin more than he is supposed to so they are wanting to know if  is okay with sending the patient a refill in to last him until the full prescription is due.    dany in chart       Please advise

## 2021-12-21 NOTE — TELEPHONE ENCOUNTER
Refill at a dose of 300 mg 3 times a day is sent to his pharmacy. He may want to start twice daily for a week or 2 and then go to 3 times daily.

## 2022-01-20 DIAGNOSIS — B35.1 ONYCHOMYCOSIS: ICD-10-CM

## 2022-01-20 RX ORDER — TERBINAFINE HYDROCHLORIDE 250 MG/1
TABLET ORAL
Qty: 84 TABLET | Refills: 0 | Status: SHIPPED | OUTPATIENT
Start: 2022-01-20 | End: 2022-03-09

## 2022-01-20 NOTE — TELEPHONE ENCOUNTER
Pharmacy asking for a refill on   terbinafine (LAMISIL) 250 MG tablet     The original prescription was discontinued on 10/1/2021 by Juliana Magana LPN. Renewing this prescription may not be appropriate. Is refill appropriate. Please and send to pharmacy if appropriate.      Recent Visits  Date Type Provider Dept   10/01/21 Office Visit MD [facility]   08/11/21 Office Visit MD [facility] recent visits within past 540 days with a meds authorizing provider and meeting all other requirements  Future Appointments  Date Type Provider Dept   04/01/22 Appointment MD [facility]   Showing future appointments within next 150 days with a meds authorizing provider and meeting all other requirements

## 2022-02-07 DIAGNOSIS — G62.9 NEUROPATHY: ICD-10-CM

## 2022-02-07 RX ORDER — GABAPENTIN 300 MG/1
300 CAPSULE ORAL 3 TIMES DAILY
Qty: 90 CAPSULE | Refills: 0 | Status: SHIPPED | OUTPATIENT
Start: 2022-02-07 | End: 2022-03-09 | Stop reason: SDUPTHER
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
## 2022-02-07 NOTE — TELEPHONE ENCOUNTER
Medication:   Requested Prescriptions     Pending Prescriptions Disp Refills    gabapentin (NEURONTIN) 300 MG capsule 180 capsule 2     Sig: Take 1 capsule by mouth 3 times daily for 90 days. Last Filled:      Patient Phone Number: [phone] (home)     Last appt: 10/1/2021   Next appt: 4/1/2022    Last OARRS: No flowsheet data found.   PDMP Monitoring:    Last PDMP Narvis Neat as Reviewed [facility]):  Review User Review Instant Review Result          Preferred Pharmacy:   [facility] [address] [facility], [address] [facility] [address] [phone]  [address]  [address] [place], [place] [id number]  Phone: [phone] Fax: [phone]    [facility] [address], [address] [facility] [phone] [place] [phone]  [address]  [address] [place] [address]  Phone: [phone] Fax: [phone]

## 2022-03-09 ENCOUNTER — OFFICE VISIT (OUTPATIENT)
Dept: FAMILY MEDICINE CLINIC | Age: 52
End: 2022-03-09
Payer: COMMERCIAL

## 2022-03-09 VITALS
HEIGHT: 72 IN | SYSTOLIC BLOOD PRESSURE: 170 MMHG | WEIGHT: 315 LBS | BODY MASS INDEX: 42.66 KG/M2 | DIASTOLIC BLOOD PRESSURE: 100 MMHG

## 2022-03-09 DIAGNOSIS — E66.01 OBESITY, MORBID (MORE THAN 100 LBS OVER IDEAL WEIGHT OR BMI > 40) (HCC): Primary | ICD-10-CM

## 2022-03-09 DIAGNOSIS — M54.50 LUMBAR PAIN: ICD-10-CM

## 2022-03-09 DIAGNOSIS — G62.9 NEUROPATHY: ICD-10-CM

## 2022-03-09 DIAGNOSIS — R53.83 FATIGUE, UNSPECIFIED TYPE: ICD-10-CM

## 2022-03-09 DIAGNOSIS — B35.1 ONYCHOMYCOSIS: ICD-10-CM

## 2022-03-09 DIAGNOSIS — R06.83 SNORING: ICD-10-CM

## 2022-03-09 DIAGNOSIS — I10 PRIMARY HYPERTENSION: ICD-10-CM

## 2022-03-09 PROCEDURE — 3017F COLORECTAL CA SCREEN DOC REV: CPT | Performed by: FAMILY MEDICINE

## 2022-03-09 PROCEDURE — G8417 CALC BMI ABV UP PARAM F/U: HCPCS | Performed by: FAMILY MEDICINE

## 2022-03-09 PROCEDURE — 4004F PT TOBACCO SCREEN RCVD TLK: CPT | Performed by: FAMILY MEDICINE

## 2022-03-09 PROCEDURE — 99214 OFFICE O/P EST MOD 30 MIN: CPT | Performed by: FAMILY MEDICINE

## 2022-03-09 PROCEDURE — G8484 FLU IMMUNIZE NO ADMIN: HCPCS | Performed by: FAMILY MEDICINE

## 2022-03-09 PROCEDURE — G8427 DOCREV CUR MEDS BY ELIG CLIN: HCPCS | Performed by: FAMILY MEDICINE

## 2022-03-09 RX ORDER — LISINOPRIL 10 MG/1
10 TABLET ORAL DAILY
Qty: 30 TABLET | Refills: 5 | Status: SHIPPED | OUTPATIENT
Start: 2022-03-09 | End: 2022-07-28

## 2022-03-09 RX ORDER — GABAPENTIN 300 MG/1
300 CAPSULE ORAL 3 TIMES DAILY
Qty: 90 CAPSULE | Refills: 0 | Status: SHIPPED | OUTPATIENT
Start: 2022-03-09 | End: 2022-04-01 | Stop reason: SDUPTHER

## 2022-03-09 RX ORDER — TERBINAFINE HYDROCHLORIDE 250 MG/1
TABLET ORAL
Qty: 84 TABLET | Refills: 0 | Status: SHIPPED | OUTPATIENT
Start: 2022-03-09 | End: 2022-07-28

## 2022-03-09 ASSESSMENT — PATIENT HEALTH QUESTIONNAIRE - PHQ9
2. FEELING DOWN, DEPRESSED OR HOPELESS: 0
SUM OF ALL RESPONSES TO PHQ QUESTIONS 1-9: 0
SUM OF ALL RESPONSES TO PHQ9 QUESTIONS 1 & 2: 0
1. LITTLE INTEREST OR PLEASURE IN DOING THINGS: 0
SUM OF ALL RESPONSES TO PHQ QUESTIONS 1-9: 0

## 2022-03-09 ASSESSMENT — ENCOUNTER SYMPTOMS
BACK PAIN: 1
SHORTNESS OF BREATH: 1
RHINORRHEA: 0
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
## 2022-03-14 ENCOUNTER — OFFICE VISIT (OUTPATIENT)
Dept: ORTHOPEDIC SURGERY | Age: 52
End: 2022-03-14
Payer: COMMERCIAL

## 2022-03-14 VITALS — BODY MASS INDEX: 44.1 KG/M2 | WEIGHT: 315 LBS | HEIGHT: 71 IN

## 2022-03-14 DIAGNOSIS — M51.36 DDD (DEGENERATIVE DISC DISEASE), LUMBAR: ICD-10-CM

## 2022-03-14 DIAGNOSIS — Z87.81 HISTORY OF VERTEBRAL FRACTURE: ICD-10-CM

## 2022-03-14 DIAGNOSIS — M47.816 LUMBAR SPONDYLOSIS: ICD-10-CM

## 2022-03-14 DIAGNOSIS — M48.062 SPINAL STENOSIS OF LUMBAR REGION WITH NEUROGENIC CLAUDICATION: ICD-10-CM

## 2022-03-14 DIAGNOSIS — M54.50 LUMBAR PAIN: ICD-10-CM

## 2022-03-14 PROCEDURE — 99204 OFFICE O/P NEW MOD 45 MIN: CPT | Performed by: INTERNAL MEDICINE

## 2022-03-14 PROCEDURE — G8484 FLU IMMUNIZE NO ADMIN: HCPCS | Performed by: INTERNAL MEDICINE

## 2022-03-14 PROCEDURE — 4004F PT TOBACCO SCREEN RCVD TLK: CPT | Performed by: INTERNAL MEDICINE

## 2022-03-14 PROCEDURE — 3017F COLORECTAL CA SCREEN DOC REV: CPT | Performed by: INTERNAL MEDICINE

## 2022-03-14 PROCEDURE — G8427 DOCREV CUR MEDS BY ELIG CLIN: HCPCS | Performed by: INTERNAL MEDICINE

## 2022-03-14 PROCEDURE — G8417 CALC BMI ABV UP PARAM F/U: HCPCS | Performed by: INTERNAL MEDICINE

## 2022-03-14 RX ORDER — CYCLOBENZAPRINE HCL 10 MG
10 TABLET ORAL NIGHTLY PRN
Qty: 30 TABLET | Refills: 1 | Status: SHIPPED | OUTPATIENT
Start: 2022-03-14 | End: 2022-04-01 | Stop reason: SDUPTHER

## 2022-03-14 RX ORDER — TRAMADOL HYDROCHLORIDE 50 MG/1
50 TABLET ORAL EVERY 6 HOURS PRN
Qty: 21 TABLET | Refills: 0 | Status: SHIPPED
Start: 2022-03-14 | End: 2022-03-15

## 2022-03-14 RX ORDER — PREDNISONE 50 MG/1
50 TABLET ORAL DAILY
Qty: 6 TABLET | Refills: 0 | Status: SHIPPED | OUTPATIENT
Start: 2022-03-14 | End: 2022-03-20
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
## 2022-03-15 DIAGNOSIS — Z87.81 HISTORY OF VERTEBRAL FRACTURE: Primary | ICD-10-CM

## 2022-03-15 RX ORDER — OXYCODONE HYDROCHLORIDE AND ACETAMINOPHEN 5; 325 MG/1; MG/1
1 TABLET ORAL EVERY 6 HOURS PRN
Qty: 20 TABLET | Refills: 0 | Status: SHIPPED | OUTPATIENT
Start: 2022-03-15 | End: 2022-03-20

## 2022-04-01 ENCOUNTER — OFFICE VISIT (OUTPATIENT)
Dept: FAMILY MEDICINE CLINIC | Age: 52
End: 2022-04-01
Payer: COMMERCIAL

## 2022-04-01 VITALS
SYSTOLIC BLOOD PRESSURE: 148 MMHG | DIASTOLIC BLOOD PRESSURE: 104 MMHG | WEIGHT: 315 LBS | TEMPERATURE: 97.2 F | BODY MASS INDEX: 44.84 KG/M2

## 2022-04-01 DIAGNOSIS — G89.29 CHRONIC BILATERAL LOW BACK PAIN WITH SCIATICA, SCIATICA LATERALITY UNSPECIFIED: Primary | ICD-10-CM

## 2022-04-01 DIAGNOSIS — I10 PRIMARY HYPERTENSION: ICD-10-CM

## 2022-04-01 DIAGNOSIS — M54.40 CHRONIC BILATERAL LOW BACK PAIN WITH SCIATICA, SCIATICA LATERALITY UNSPECIFIED: Primary | ICD-10-CM

## 2022-04-01 DIAGNOSIS — G62.9 NEUROPATHY: ICD-10-CM

## 2022-04-01 PROCEDURE — G8417 CALC BMI ABV UP PARAM F/U: HCPCS | Performed by: FAMILY MEDICINE

## 2022-04-01 PROCEDURE — G8427 DOCREV CUR MEDS BY ELIG CLIN: HCPCS | Performed by: FAMILY MEDICINE

## 2022-04-01 PROCEDURE — 3017F COLORECTAL CA SCREEN DOC REV: CPT | Performed by: FAMILY MEDICINE

## 2022-04-01 PROCEDURE — 4004F PT TOBACCO SCREEN RCVD TLK: CPT | Performed by: FAMILY MEDICINE

## 2022-04-01 PROCEDURE — 99213 OFFICE O/P EST LOW 20 MIN: CPT | Performed by: FAMILY MEDICINE

## 2022-04-01 RX ORDER — ACETAMINOPHEN, ASPIRIN AND CAFFEINE 250; 250; 65 MG/1; MG/1; MG/1
TABLET, FILM COATED ORAL
COMMUNITY

## 2022-04-01 RX ORDER — GABAPENTIN 300 MG/1
300 CAPSULE ORAL 3 TIMES DAILY
Qty: 180 CAPSULE | Refills: 1 | Status: SHIPPED | OUTPATIENT
Start: 2022-04-01 | End: 2022-07-28

## 2022-04-01 RX ORDER — CYCLOBENZAPRINE HCL 10 MG
10 TABLET ORAL NIGHTLY PRN
Qty: 90 TABLET | Refills: 1 | Status: SHIPPED | OUTPATIENT
Start: 2022-04-01 | End: 2022-05-24 | Stop reason: SDUPTHER

## 2022-04-01 RX ORDER — AMLODIPINE BESYLATE 5 MG/1
5 TABLET ORAL DAILY
Qty: 30 TABLET | Refills: 3 | Status: SHIPPED | OUTPATIENT
Start: 2022-04-01 | End: 2022-07-28

## 2022-04-01 ASSESSMENT — ENCOUNTER SYMPTOMS
CHEST TIGHTNESS: 0
SHORTNESS OF BREATH: 0
BACK PAIN: 1

## 2022-04-01 NOTE — PROGRESS NOTES
SUBJECTIVE:    Miko Awan is a 46 y.o. male who presents for a follow up visit. Chief Complaint   Patient presents with    6 Month Follow-Up     No lab         Hypertension  This is a chronic problem. The current episode started more than 1 year ago. The problem is uncontrolled. Associated symptoms include headaches ( using OTC Excederine). Pertinent negatives include no chest pain or shortness of breath. Agents associated with hypertension include NSAIDs. Risk factors for coronary artery disease include male gender, obesity and sedentary lifestyle. Past treatments include ACE inhibitors. The current treatment provides mild improvement. Compliance problems include exercise and diet. Back Pain  This is a chronic problem. The current episode started more than 1 year ago. The problem has been waxing and waning since onset. The pain is present in the lumbar spine. The quality of the pain is described as aching. The pain radiates to the right thigh. The pain is moderate. The symptoms are aggravated by standing and bending. Associated symptoms include headaches ( using OTC Excederine). Pertinent negatives include no chest pain. Risk factors include obesity, poor posture and lack of exercise. He has tried ice, heat, NSAIDs and home exercises for the symptoms. The treatment provided significant relief. Patient's medications, allergies, past medical,surgical, social and family histories were reviewed and updated as appropriate. Past Medical History:   Diagnosis Date    Chronic back pain     Hyperlipidemia     Neuropathy     Obesity      No past surgical history on file.   Family History   Problem Relation Age of Onset    Diabetes Maternal Grandmother     Diabetes Father          in early 62s, ? cause     Social History     Tobacco Use    Smoking status: Current Every Day Smoker     Packs/day: 1.00    Smokeless tobacco: Never Used   Substance Use Topics    Alcohol use: No      No Known Allergies  Current Outpatient Medications on File Prior to Visit   Medication Sig Dispense Refill    aspirin-acetaminophen-caffeine (EXCEDRIN MIGRAINE) 250-250-65 MG per tablet Take by mouth As needed for HA      lisinopril (PRINIVIL;ZESTRIL) 10 MG tablet Take 1 tablet by mouth daily 30 tablet 5    terbinafine (LAMISIL) 250 MG tablet TAKE ONE TABLET BY MOUTH DAILY 84 tablet 0     No current facility-administered medications on file prior to visit. Review of Systems   Respiratory: Negative for chest tightness and shortness of breath. Cardiovascular: Negative for chest pain and leg swelling. Musculoskeletal: Positive for back pain. Neurological: Positive for headaches ( using OTC Excederine). Negative for dizziness and light-headedness. OBJECTIVE:    BP (!) 148/104   Temp 97.2 °F (36.2 °C)   Wt (!) 326 lb (147.9 kg)   BMI 44.84 kg/m²    Vitals:    04/01/22 1001 04/01/22 1033   BP: (!) 150/98 (!) 148/104   Temp: 97.2 °F (36.2 °C)    Weight: (!) 326 lb (147.9 kg)        Physical Exam  Constitutional:       Appearance: He is well-developed. HENT:      Head: Normocephalic and atraumatic. Right Ear: External ear normal.      Left Ear: External ear normal.      Nose: Nose normal.   Eyes:      General:         Right eye: No discharge. Conjunctiva/sclera: Conjunctivae normal.   Neck:      Thyroid: No thyromegaly. Vascular: No JVD. Trachea: No tracheal deviation. Cardiovascular:      Rate and Rhythm: Normal rate and regular rhythm. Heart sounds: Normal heart sounds. Pulmonary:      Effort: Pulmonary effort is normal. No respiratory distress. Breath sounds: Normal breath sounds. No rales. Musculoskeletal:      Cervical back: Normal range of motion and neck supple. Lymphadenopathy:      Cervical: No cervical adenopathy. Skin:     General: Skin is warm and dry. Neurological:      Mental Status: He is alert and oriented to person, place, and time. ASSESSMENT/PLAN:    Jesusita Newton was seen today for 6 month follow-up. Diagnoses and all orders for this visit:    Chronic bilateral low back pain with sciatica, sciatica laterality unspecified  -     cyclobenzaprine (FLEXERIL) 10 MG tablet; Take 1 tablet by mouth nightly as needed for Muscle spasms    Neuropathy  -     gabapentin (NEURONTIN) 300 MG capsule; Take 1 capsule by mouth 3 times daily for 30 days. Primary hypertension  Needing better control. We will continue lisinopril 10 mg daily and add amlodipine. -     amLODIPine (NORVASC) 5 MG tablet; Take 1 tablet by mouth daily        Return in about 6 weeks (around 5/13/2022). Please note portions of this note were completed with a voicerecognition program.  Efforts were made to edit the dictations but occasionally words are mis-transcribed.

## 2022-04-06 ENCOUNTER — TELEPHONE (OUTPATIENT)
Dept: ORTHOPEDIC SURGERY | Age: 52
End: 2022-04-06

## 2022-04-06 ENCOUNTER — OFFICE VISIT (OUTPATIENT)
Dept: ORTHOPEDIC SURGERY | Age: 52
End: 2022-04-06
Payer: COMMERCIAL

## 2022-04-06 VITALS — HEIGHT: 75 IN | BODY MASS INDEX: 39.17 KG/M2 | WEIGHT: 315 LBS

## 2022-04-06 DIAGNOSIS — M47.816 LUMBAR SPONDYLOSIS: ICD-10-CM

## 2022-04-06 DIAGNOSIS — M51.27 HERNIATION OF INTERVERTEBRAL DISC BETWEEN L5 AND S1: Primary | ICD-10-CM

## 2022-04-06 DIAGNOSIS — M51.36 DDD (DEGENERATIVE DISC DISEASE), LUMBAR: ICD-10-CM

## 2022-04-06 DIAGNOSIS — Z87.81 HISTORY OF VERTEBRAL FRACTURE: ICD-10-CM

## 2022-04-06 PROCEDURE — G8427 DOCREV CUR MEDS BY ELIG CLIN: HCPCS | Performed by: INTERNAL MEDICINE

## 2022-04-06 PROCEDURE — 3017F COLORECTAL CA SCREEN DOC REV: CPT | Performed by: INTERNAL MEDICINE

## 2022-04-06 PROCEDURE — 99214 OFFICE O/P EST MOD 30 MIN: CPT | Performed by: INTERNAL MEDICINE

## 2022-04-06 PROCEDURE — G8417 CALC BMI ABV UP PARAM F/U: HCPCS | Performed by: INTERNAL MEDICINE

## 2022-04-06 PROCEDURE — 4004F PT TOBACCO SCREEN RCVD TLK: CPT | Performed by: INTERNAL MEDICINE

## 2022-04-06 RX ORDER — HYDROCODONE BITARTRATE AND ACETAMINOPHEN 5; 325 MG/1; MG/1
1 TABLET ORAL EVERY 6 HOURS PRN
Qty: 20 TABLET | Refills: 0 | Status: SHIPPED | OUTPATIENT
Start: 2022-04-06 | End: 2022-04-06 | Stop reason: SDUPTHER

## 2022-04-06 RX ORDER — HYDROCODONE BITARTRATE AND ACETAMINOPHEN 5; 325 MG/1; MG/1
1 TABLET ORAL EVERY 6 HOURS PRN
Qty: 20 TABLET | Refills: 0 | Status: SHIPPED | OUTPATIENT
Start: 2022-04-06 | End: 2022-04-11

## 2022-04-06 NOTE — PROGRESS NOTES
Chief Complaint:   Chief Complaint   Patient presents with    Lower Back Pain     here for MRI TR for lumbar/thoracic, has had no change since last visit, hurts to lift and to do daily activities, tries not to overdo it but feels he needs to continue to be active          History of Present Illness:       Patient is a 46 y.o. male returns follow up for the above complaint. The patient was last seen approximately 3 weeksago. The symptoms are mildly improved since the last visit. The patient has had further testing for the problem. In the interim MRI scan completed which is outlined below in detail    Partial therapeutic response to trial of high-dose steroids. Ultram discontinued for Norco related to ineffective pain control in the interim since last visit. Pain levels 6-8/10 back pain lumbosacral para axial bilateral    Sitting better tolerated than standing. Back pain to leg pain ratio 50:50 radiating into the posterior thighs bilaterally    He denies any new onset or progressive weakness of the lower extremities he denies any new onset bowel or bladder dysfunction     Past Medical History:        Past Medical History:   Diagnosis Date    Chronic back pain     Hyperlipidemia     Neuropathy     Obesity         Present Medications:         Current Outpatient Medications   Medication Sig Dispense Refill    diclofenac (VOLTAREN) 50 MG EC tablet Take 1 tablet by mouth 2 times daily 60 tablet 1    HYDROcodone-acetaminophen (NORCO) 5-325 MG per tablet Take 1 tablet by mouth every 6 hours as needed for Pain for up to 5 days. 20 tablet 0    aspirin-acetaminophen-caffeine (EXCEDRIN MIGRAINE) 781-758-83 MG per tablet Take by mouth As needed for HA      gabapentin (NEURONTIN) 300 MG capsule Take 1 capsule by mouth 3 times daily for 30 days.  180 capsule 1    cyclobenzaprine (FLEXERIL) 10 MG tablet Take 1 tablet by mouth nightly as needed for Muscle spasms 90 tablet 1    amLODIPine (NORVASC) 5 MG tablet Take 1 tablet by mouth daily 30 tablet 3    lisinopril (PRINIVIL;ZESTRIL) 10 MG tablet Take 1 tablet by mouth daily 30 tablet 5    terbinafine (LAMISIL) 250 MG tablet TAKE ONE TABLET BY MOUTH DAILY 84 tablet 0     No current facility-administered medications for this visit. Allergies:      No Known Allergies        Review of Systems:    Pertinent items are noted in HPI        Vital Signs: There were no vitals filed for this visit. General Exam:     Constitutional: Patient is adequately groomed with no evidence of malnutrition    Physical Exam: lower back   General: Obese body habitus no acute distress   Primary Exam:    Inspection: No deformity attributable curvature      Palpation: No focal trigger point tenderness      Strength: Normal lower extremity      Special Tests: SLR positive for back pain bilaterally      Skin: There are no rashes, ulcerations or lesions. Gait: Nonantalgic      Neurovascular - non focal and intact       Additional Comments:        Additional Examinations:                  Office Imaging Results/Procedures PerformedToday:           Office Procedures:     Orders Placed This Encounter   Procedures    Ambulatory referral to Occupational Therapy     Referral Priority:   Routine     Referral Type:   Eval and Treat     Referral Reason:   Specialty Services Required     Requested Specialty:   Occupational Therapy     Number of Visits Requested:   1           Other Outside Imaging and Testing Personally Reviewed:       Site: [facility] #: [id number] #: [id number] Procedure: MR Lumbar Spine w/o Contrast ; Reason for Exam: Lumbar pain, spinal stenosis of lumbar region, rule out stenosis history of L1 vertebral fracture   This document is confidential medical information.  Unauthorized disclosure or use of this information is prohibited by law. If you are not the intended recipient of this document, please advise us by calling immediately [phone].     [address] [place] [address], [address] [place] [address]           Patient Name: Jayro Colin   Case ID: [id number]   Patient : 1970   Referring Physician: Jose Sinha MD   Exam Date: 2022   Exam Description: MR Lumbar Spine w/o Contrast            HISTORY:  Low back pain.  History of L1 fracture in either 2018 or 2019.       TECHNICAL FACTORS:  Long- and short-axis fat- and water-weighted images were performed.       COMPARISON:  None.       FINDINGS:  Chronic compression fracture of the L1 vertebral body with nearly 5 mm retropulsion    of the posterior bony margin resulting in effacement of the thecal sac.  Greater than 75%    maximal loss of height of this vertebral body is noted.       The marrow signal is unremarkable.       T10-T11, T11-T12: Shallow bulging disc that mildly effaces the thecal sac without cord    compression or canal stenosis.  The foramen appears mildly narrowed bilaterally at T10-T11.       T12-L1: No evidence of compressive disc disease.  The retropulsed bony margin of T12 results in    effacement of the thecal sac.       L1-L2: Minimal disc displacement bilobed in appearance barely indenting the thecal sac.  No    evidence of canal stenosis or foraminal stenosis.       L2-L3: Shallow disc bulge barely effacing the thecal sac.  No evidence of canal stenosis or    foraminal stenosis.       L3-L4: No evidence of compressive disc disease or canal stenosis or foraminal stenosis.  Disc    displacement.       L4-L5: Disc displacement barely encroaching into the floor of the left foramen.  No evidence of    canal stenosis or foraminal stenosis.       L5-S1: Disc desiccation.  Broad-based bulging disc more prominent central and leftward mildly    effacing the thecal sac and abutting the descending left S1 nerve root.  Facet arthropathy with    mild to moderate right greater than left foraminal stenosis.  Abutment of the right L5 nerve    root is present.  No evidence of canal stenosis.       The distal cord and conus appears unremarkable.                   CONCLUSION:   1. L5-S1 bulging disc more prominent central and leftward mildly effacing the thecal sac and    abutting the descending left S1 nerve root.  Facet arthropathy with mild to moderate right    greater than left foraminal stenosis with abutment of the right L5 nerve root. 2. Chronic compression fracture of L1 with nearly 5 mm retropulsion of the posterior bony    margin resulting in effacement of the thecal sac. 3. Please refer to the body of report for a detailed level by level description of the findings       Thank you for the opportunity to provide your interpretation.               Jori Solares MD       A: [place] 03/22/2022 12:40 PM               Assessment   Impression: . Encounter Diagnoses   Name Primary?  Herniation of intervertebral disc between L5 and S1 Yes    DDD (degenerative disc disease), lumbar     Lumbar spondylosis     History of vertebral fracture         History of traumatic vertebral plana compression fracture L1-2019      Plan: Activity modification lumbar disc protocol  Formal course of PT lumbar stabilization/Jerry program and trial of mechanical traction. Lumbar spine intervention injection L HALI-L5/S1 left translaminar if symptoms show no appreciable improvement  Medical pain management as per previous multimodal inclusive of gabapentin and Flexeril, commence diclofenac and only as needed use of Norco No. 20 no refills  ORT on follow-up with any further request for narcotic analgesia      . Orders:        Orders Placed This Encounter   Procedures    Ambulatory referral to Occupational Therapy     Referral Priority:   Routine     Referral Type:   Eval and Treat     Referral Reason:   Specialty Services Required     Requested Specialty:   Occupational Therapy     Number of Visits Requested:   1         Kaushik Garcia MD.      Candace Bruno:     \"This note was dictated with voice recognition software. Though review and correction are routine, we apologize for any errors. \"

## 2022-04-06 NOTE — LETTER
[address]  [address] [facility] [address]  Phone: [phone]  Fax: [phone]    Manolo Woodall MD    April 6, 2022     Rossi Flower MD  [facility] [address]    Patient: Yvon Mendoza   MR Number: [id number]   YOB: 1970   Date of Visit: 4/6/2022       Dear Tennis Ching:    Thank you for referring Yvon Mendoza to me for evaluation/treatment. Below are the relevant portions of my assessment and plan of care. Impression: . Encounter Diagnoses   Name Primary?  Herniation of intervertebral disc between L5 and S1 Yes    DDD (degenerative disc disease), lumbar     History of vertebral fracture     Lumbar spondylosis               Plan: Activity modification lumbar disc protocol  Formal course of PT lumbar stabilization/Jerry program and trial of mechanical traction. Lumbar spine intervention injection L HALI-L5/S1 left translaminar if symptoms show no appreciable improvement  Medical pain management as per previous multimodal inclusive of gabapentin and Flexeril, commence diclofenac and only as needed use of Norco No. 20 no refills  ORT on follow-up with any further request for narcotic analgesia             Orders:      No orders of the defined types were placed in this encounter. Jelena Kumar MD.      Disclaimer: \"This note was dictated with voice recognition software. Though review and correction are routine, we apologize for any errors. \"       If you have questions, please do not hesitate to call me. I look forward to following Jose ISAACS along with you.     Sincerely,      Manolo Woodall MD

## 2022-04-22 ENCOUNTER — TELEPHONE (OUTPATIENT)
Dept: ORTHOPEDIC SURGERY | Age: 52
End: 2022-04-22

## 2022-04-22 NOTE — TELEPHONE ENCOUNTER
S/W pt that he should have 1 refill for each medication remaining and to contact the [facility] in [place] to refill.   Pt v/u

## 2022-04-22 NOTE — TELEPHONE ENCOUNTER
Prescription Refill     Medication Name:  [address]: [facility]  Address: [address], [address] [address], [address]  Phone: [phone]    Patient Contact Number:  [phone]

## 2022-04-25 DIAGNOSIS — M48.062 SPINAL STENOSIS OF LUMBAR REGION WITH NEUROGENIC CLAUDICATION: ICD-10-CM

## 2022-04-25 DIAGNOSIS — M54.50 LUMBAR PAIN: ICD-10-CM

## 2022-04-29 ENCOUNTER — TELEPHONE (OUTPATIENT)
Dept: ORTHOPEDIC SURGERY | Age: 52
End: 2022-04-29

## 2022-04-29 NOTE — TELEPHONE ENCOUNTER
Refill requests have come in for prednisone and tramadol this week. Pt no showed his 4/25/22 appt. We have made multiple attempts to reach the pt to discuss medications, as the last OV note states no Norco refills and \"ORT on follow-up with any further request for narcotic analgesia. \"    LVM with pt again today to relay this message and to call us to discuss and reschedule. Please advise if any further action needed.

## 2022-04-29 NOTE — TELEPHONE ENCOUNTER
Patient was to have started PT and LEDI was to have been scheduled for 3 wks out. No refills consideration until there is correspondence from the patient. And confirmation of PT and or LEDI status.

## 2022-05-02 RX ORDER — TRAMADOL HYDROCHLORIDE 50 MG/1
TABLET ORAL
Qty: 21 TABLET | OUTPATIENT
Start: 2022-05-02

## 2022-05-02 RX ORDER — PREDNISONE 50 MG/1
TABLET ORAL
Qty: 6 TABLET | Refills: 0 | OUTPATIENT
Start: 2022-05-02

## 2022-05-02 NOTE — TELEPHONE ENCOUNTER
As previously discussed in separate encounter. LVM multiple times with pt for return call for meds to be refilled.

## 2022-05-06 RX ORDER — PREDNISONE 50 MG/1
TABLET ORAL
Qty: 6 TABLET | Refills: 0 | OUTPATIENT
Start: 2022-05-06

## 2022-05-06 RX ORDER — METHYLPREDNISOLONE 4 MG/1
TABLET ORAL
Qty: 1 KIT | Refills: 0 | Status: SHIPPED | OUTPATIENT
Start: 2022-05-06 | End: 2022-05-24 | Stop reason: ALTCHOICE

## 2022-05-06 NOTE — TELEPHONE ENCOUNTER
Previously refused 4 days ago 05/02/22 for needing to schedule an appt. Pt is scheduled for LEDI 05/10/22. Please advise.

## 2022-05-10 ENCOUNTER — HOSPITAL ENCOUNTER (OUTPATIENT)
Dept: INTERVENTIONAL RADIOLOGY/VASCULAR | Age: 52
Discharge: HOME | End: 2022-05-10

## 2022-05-11 DIAGNOSIS — M48.062 SPINAL STENOSIS OF LUMBAR REGION WITH NEUROGENIC CLAUDICATION: ICD-10-CM

## 2022-05-11 DIAGNOSIS — M54.50 LUMBAR PAIN: ICD-10-CM

## 2022-05-13 RX ORDER — METHYLPREDNISOLONE 4 MG/1
TABLET ORAL
Qty: 21 TABLET | OUTPATIENT
Start: 2022-05-13

## 2022-05-16 ENCOUNTER — HOSPITAL ENCOUNTER (OUTPATIENT)
Dept: INTERVENTIONAL RADIOLOGY/VASCULAR | Age: 52
Discharge: HOME | End: 2022-05-16

## 2022-05-19 ENCOUNTER — TELEPHONE (OUTPATIENT)
Dept: ORTHOPEDIC SURGERY | Age: 52
End: 2022-05-19

## 2022-05-20 NOTE — TELEPHONE ENCOUNTER
LVM FOR PT LETTING HIM KNOW [facility]U AND [facility] ARE OUT OF OFFICE, BUT WOULD F/U WITH HIM NEXT WEEK ONCE RETURNED.

## 2022-05-24 ENCOUNTER — OFFICE VISIT (OUTPATIENT)
Dept: ORTHOPEDIC SURGERY | Age: 52
End: 2022-05-24
Payer: COMMERCIAL

## 2022-05-24 VITALS — WEIGHT: 315 LBS | BODY MASS INDEX: 39.17 KG/M2 | HEIGHT: 75 IN

## 2022-05-24 DIAGNOSIS — M51.27 HERNIATION OF INTERVERTEBRAL DISC BETWEEN L5 AND S1: Primary | ICD-10-CM

## 2022-05-24 DIAGNOSIS — M51.36 DDD (DEGENERATIVE DISC DISEASE), LUMBAR: ICD-10-CM

## 2022-05-24 DIAGNOSIS — M54.40 CHRONIC BILATERAL LOW BACK PAIN WITH SCIATICA, SCIATICA LATERALITY UNSPECIFIED: ICD-10-CM

## 2022-05-24 DIAGNOSIS — M47.816 LUMBAR SPONDYLOSIS: ICD-10-CM

## 2022-05-24 DIAGNOSIS — G89.29 CHRONIC BILATERAL LOW BACK PAIN WITH SCIATICA, SCIATICA LATERALITY UNSPECIFIED: ICD-10-CM

## 2022-05-24 DIAGNOSIS — Z87.81 HISTORY OF VERTEBRAL FRACTURE: ICD-10-CM

## 2022-05-24 PROCEDURE — 99213 OFFICE O/P EST LOW 20 MIN: CPT | Performed by: INTERNAL MEDICINE

## 2022-05-24 PROCEDURE — 3017F COLORECTAL CA SCREEN DOC REV: CPT | Performed by: INTERNAL MEDICINE

## 2022-05-24 PROCEDURE — G8427 DOCREV CUR MEDS BY ELIG CLIN: HCPCS | Performed by: INTERNAL MEDICINE

## 2022-05-24 PROCEDURE — G8417 CALC BMI ABV UP PARAM F/U: HCPCS | Performed by: INTERNAL MEDICINE

## 2022-05-24 PROCEDURE — 4004F PT TOBACCO SCREEN RCVD TLK: CPT | Performed by: INTERNAL MEDICINE

## 2022-05-24 RX ORDER — CYCLOBENZAPRINE HCL 10 MG
10 TABLET ORAL NIGHTLY PRN
Qty: 90 TABLET | Refills: 1 | Status: SHIPPED | OUTPATIENT
Start: 2022-05-24

## 2022-05-24 RX ORDER — HYDROCODONE BITARTRATE AND ACETAMINOPHEN 5; 325 MG/1; MG/1
1 TABLET ORAL EVERY 6 HOURS PRN
Qty: 20 TABLET | Refills: 0 | Status: SHIPPED | OUTPATIENT
Start: 2022-05-24 | End: 2022-05-31

## 2022-05-24 RX ORDER — HYDROCODONE BITARTRATE AND ACETAMINOPHEN 5; 325 MG/1; MG/1
1 TABLET ORAL EVERY 6 HOURS PRN
Qty: 20 TABLET | Refills: 0 | Status: SHIPPED | OUTPATIENT
Start: 2022-05-24 | End: 2022-05-24 | Stop reason: SDUPTHER

## 2022-05-24 NOTE — PROGRESS NOTES
Chief Complaint:   Chief Complaint   Patient presents with    Lower Back Pain          History of Present Illness:       Patient is a 46 y.o. male returns follow up for the above complaint. The patient was last seen approximately 6 weeksago. The symptoms show no change since the last visit. The patient has had no further testing for the problem. Back pain remains problematic and continues to follow discogenic provocative pattern    Back pain to leg pain ratio 100:0    L HALI has been rescheduled    Pain levels 9/10    Continues on diclofenac and Flexeril    He denies any new onset progressive weakness of the lower extremities     Past Medical History:        Past Medical History:   Diagnosis Date    Chronic back pain     Hyperlipidemia     Neuropathy     Obesity         Present Medications:         Current Outpatient Medications   Medication Sig Dispense Refill    methylPREDNISolone (MEDROL, LESLEE,) 4 MG tablet By mouth. 1 kit 0    diclofenac (VOLTAREN) 50 MG EC tablet Take 1 tablet by mouth 2 times daily 60 tablet 1    aspirin-acetaminophen-caffeine (EXCEDRIN MIGRAINE) 972-332-49 MG per tablet Take by mouth As needed for HA      gabapentin (NEURONTIN) 300 MG capsule Take 1 capsule by mouth 3 times daily for 30 days. 180 capsule 1    cyclobenzaprine (FLEXERIL) 10 MG tablet Take 1 tablet by mouth nightly as needed for Muscle spasms 90 tablet 1    amLODIPine (NORVASC) 5 MG tablet Take 1 tablet by mouth daily 30 tablet 3    lisinopril (PRINIVIL;ZESTRIL) 10 MG tablet Take 1 tablet by mouth daily 30 tablet 5    terbinafine (LAMISIL) 250 MG tablet TAKE ONE TABLET BY MOUTH DAILY 84 tablet 0     No current facility-administered medications for this visit. Allergies:      No Known Allergies        Review of Systems:    Pertinent items are noted in HPI        Vital Signs: There were no vitals filed for this visit.      General Exam:     Constitutional: Patient is adequately groomed with no evidence of malnutrition    Physical Exam: lower back   General: Obese body habitus   Primary Exam:    Inspection: No deformity atrophy appreciable curvature      Palpation: No focal trigger point tenderness      Range of Motion: 50/10 pain with extension greater than flexion      Strength: Normal lower extremity      Special Tests: Negative SLR      Skin: There are no rashes, ulcerations or lesions. Gait: Nonantalgic    Neurovascular - non focal and intact       Additional Comments:        Additional Examinations:                  Office Imaging Results/Procedures PerformedToday:        Office Procedures:   No orders of the defined types were placed in this encounter. Other Outside Imaging and Testing Personally Reviewed:    No results found. Assessment   Impression: . Encounter Diagnoses   Name Primary?  Herniation of intervertebral disc between L5 and S1 Yes    DDD (degenerative disc disease), lumbar     Lumbar spondylosis     History of vertebral fracture         ORT:0  History of traumatic vertebral plana compression fracture L1-2090    Plan:     Continue medical pain management as per previous minimize use of narcotics which she is doing  Proceed with L HALI as recommended  Activity modification lumbar disc precautions and lumbar stabilization home exercise program             Orders:      No orders of the defined types were placed in this encounter. Adrianna Suarez MD.      Disclaimer: \"This note was dictated with voice recognition software. Though review and correction are routine, we apologize for any errors. \"

## 2022-05-25 RX ORDER — METHYLPREDNISOLONE 4 MG/1
TABLET ORAL
Qty: 21 TABLET | OUTPATIENT
Start: 2022-05-25

## 2022-05-25 RX ORDER — TRAMADOL HYDROCHLORIDE 50 MG/1
TABLET ORAL
Qty: 21 TABLET | OUTPATIENT
Start: 2022-05-25

## 2022-05-25 RX ORDER — PREDNISONE 50 MG/1
TABLET ORAL
Qty: 6 TABLET | Refills: 0 | OUTPATIENT
Start: 2022-05-25

## 2022-06-02 RX ORDER — METHYLPREDNISOLONE 4 MG/1
TABLET ORAL
Qty: 21 TABLET | OUTPATIENT
Start: 2022-06-02

## 2022-06-07 ENCOUNTER — OFFICE VISIT (OUTPATIENT)
Dept: ORTHOPEDIC SURGERY | Age: 52
End: 2022-06-07

## 2022-06-07 VITALS — WEIGHT: 315 LBS | BODY MASS INDEX: 39.17 KG/M2 | HEIGHT: 75 IN

## 2022-06-07 DIAGNOSIS — S33.9XXA SPRAIN OF LIGAMENT OF LUMBOSACRAL JOINT, INITIAL ENCOUNTER: ICD-10-CM

## 2022-06-07 DIAGNOSIS — M51.27 HERNIATION OF INTERVERTEBRAL DISC BETWEEN L5 AND S1: ICD-10-CM

## 2022-06-07 DIAGNOSIS — M54.50 LUMBAR PAIN: ICD-10-CM

## 2022-06-07 DIAGNOSIS — Z87.81 HISTORY OF VERTEBRAL FRACTURE: ICD-10-CM

## 2022-06-07 PROCEDURE — 4004F PT TOBACCO SCREEN RCVD TLK: CPT | Performed by: INTERNAL MEDICINE

## 2022-06-07 PROCEDURE — G8427 DOCREV CUR MEDS BY ELIG CLIN: HCPCS | Performed by: INTERNAL MEDICINE

## 2022-06-07 PROCEDURE — G8417 CALC BMI ABV UP PARAM F/U: HCPCS | Performed by: INTERNAL MEDICINE

## 2022-06-07 PROCEDURE — 99214 OFFICE O/P EST MOD 30 MIN: CPT | Performed by: INTERNAL MEDICINE

## 2022-06-07 PROCEDURE — 3017F COLORECTAL CA SCREEN DOC REV: CPT | Performed by: INTERNAL MEDICINE

## 2022-06-07 RX ORDER — HYDROCODONE BITARTRATE AND ACETAMINOPHEN 5; 325 MG/1; MG/1
1 TABLET ORAL EVERY 6 HOURS PRN
Qty: 18 TABLET | Refills: 0 | Status: SHIPPED | OUTPATIENT
Start: 2022-06-07 | End: 2022-06-14
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
## 2022-06-08 RX ORDER — METHYLPREDNISOLONE 4 MG/1
TABLET ORAL
Qty: 21 TABLET | OUTPATIENT
Start: 2022-06-08

## 2022-06-08 NOTE — TELEPHONE ENCOUNTER
This Rx is requested almost weekly, if not more. Just forwarding it to you to let you know. Pt was just see in office yesterday 6/7/22.

## 2022-06-13 ENCOUNTER — HOSPITAL ENCOUNTER (OUTPATIENT)
Dept: INTERVENTIONAL RADIOLOGY/VASCULAR | Age: 52
Discharge: HOME | End: 2022-06-13

## 2022-06-13 RX ORDER — METHYLPREDNISOLONE 4 MG/1
TABLET ORAL
Qty: 21 TABLET | OUTPATIENT
Start: 2022-06-13

## 2022-06-17 ENCOUNTER — HOSPITAL ENCOUNTER (OUTPATIENT)
Dept: INTERVENTIONAL RADIOLOGY/VASCULAR | Age: 52
Discharge: HOME | End: 2022-06-17
Payer: COMMERCIAL

## 2022-06-17 DIAGNOSIS — M51.26 DISPLACEMENT OF LUMBAR INTERVERTEBRAL DISC WITHOUT MYELOPATHY: ICD-10-CM

## 2022-06-17 PROCEDURE — 6360000004 HC RX CONTRAST MEDICATION: Performed by: RADIOLOGY

## 2022-06-17 PROCEDURE — 62323 NJX INTERLAMINAR LMBR/SAC: CPT

## 2022-06-17 PROCEDURE — 6360000002 HC RX W HCPCS

## 2022-06-17 RX ADMIN — IOHEXOL 10 ML: 180 INJECTION INTRAVENOUS at 11:13

## 2022-06-20 DIAGNOSIS — M54.50 LUMBAR PAIN: ICD-10-CM

## 2022-06-20 DIAGNOSIS — M48.062 SPINAL STENOSIS OF LUMBAR REGION WITH NEUROGENIC CLAUDICATION: ICD-10-CM

## 2022-06-22 ENCOUNTER — TELEPHONE (OUTPATIENT)
Dept: ORTHOPEDIC SURGERY | Age: 52
End: 2022-06-22

## 2022-06-22 RX ORDER — TRAMADOL HYDROCHLORIDE 50 MG/1
TABLET ORAL
Qty: 20 TABLET | Refills: 0 | Status: SHIPPED | OUTPATIENT
Start: 2022-06-22 | End: 2022-06-29

## 2022-06-22 RX ORDER — METHYLPREDNISOLONE 4 MG/1
TABLET ORAL
Qty: 21 TABLET | OUTPATIENT
Start: 2022-06-22

## 2022-06-22 NOTE — TELEPHONE ENCOUNTER
Medication refill requests for medrol dose pack and tramadol were sent from pharmacy. These same refill requests have been sent from pharmacy multiple times and refused, due to the fact that they may not be appropriate for refill. I have LVM for the pt to discuss any medication refill requests he may have, and to advise that the pt needs to schedule an appt following his epidural injection for 7-10 days after the injection, per his \"Epidural Scheduling Instructions\" paper he received in the office. Gave callback number and asked the pt to give the best phone number where he can be reached.

## 2022-06-24 ENCOUNTER — TELEPHONE (OUTPATIENT)
Dept: ORTHOPEDIC SURGERY | Age: 52
End: 2022-06-24

## 2022-06-24 DIAGNOSIS — M48.062 SPINAL STENOSIS OF LUMBAR REGION WITH NEUROGENIC CLAUDICATION: ICD-10-CM

## 2022-06-24 DIAGNOSIS — M54.50 LUMBAR PAIN: ICD-10-CM

## 2022-06-24 NOTE — TELEPHONE ENCOUNTER
General Question     Subject: [facility] PLEASE CALL HIM BACK.   Patient and /or Facility Request[facility]arence [facility]  Contact Number: [phone]
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
## 2022-06-28 ENCOUNTER — OFFICE VISIT (OUTPATIENT)
Dept: ORTHOPEDIC SURGERY | Age: 52
End: 2022-06-28

## 2022-06-28 DIAGNOSIS — M51.26 DISPLACEMENT OF LUMBAR INTERVERTEBRAL DISC: ICD-10-CM

## 2022-06-28 DIAGNOSIS — M47.816 LUMBAR SPONDYLOSIS: ICD-10-CM

## 2022-06-28 DIAGNOSIS — S33.9XXA SPRAIN OF LIGAMENT OF LUMBOSACRAL JOINT, INITIAL ENCOUNTER: ICD-10-CM

## 2022-06-28 DIAGNOSIS — M51.36 DDD (DEGENERATIVE DISC DISEASE), LUMBAR: ICD-10-CM

## 2022-06-28 DIAGNOSIS — M54.50 LUMBAR PAIN: ICD-10-CM

## 2022-06-28 DIAGNOSIS — Z87.81 HISTORY OF VERTEBRAL COMPRESSION FRACTURE: ICD-10-CM

## 2022-06-28 PROCEDURE — G8417 CALC BMI ABV UP PARAM F/U: HCPCS | Performed by: INTERNAL MEDICINE

## 2022-06-28 PROCEDURE — G8428 CUR MEDS NOT DOCUMENT: HCPCS | Performed by: INTERNAL MEDICINE

## 2022-06-28 PROCEDURE — 3017F COLORECTAL CA SCREEN DOC REV: CPT | Performed by: INTERNAL MEDICINE

## 2022-06-28 PROCEDURE — 99214 OFFICE O/P EST MOD 30 MIN: CPT | Performed by: INTERNAL MEDICINE

## 2022-06-28 PROCEDURE — 4004F PT TOBACCO SCREEN RCVD TLK: CPT | Performed by: INTERNAL MEDICINE

## 2022-06-28 RX ORDER — HYDROCODONE BITARTRATE AND ACETAMINOPHEN 5; 325 MG/1; MG/1
1 TABLET ORAL EVERY 6 HOURS PRN
Qty: 20 TABLET | Refills: 0 | Status: SHIPPED | OUTPATIENT
Start: 2022-06-28 | End: 2022-07-03
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
## 2022-06-30 RX ORDER — TRAMADOL HYDROCHLORIDE 50 MG/1
TABLET ORAL
Qty: 21 TABLET | OUTPATIENT
Start: 2022-06-30 | End: 2022-07-07

## 2022-07-28 ENCOUNTER — OFFICE VISIT (OUTPATIENT)
Dept: FAMILY MEDICINE CLINIC | Age: 52
End: 2022-07-28
Payer: COMMERCIAL

## 2022-07-28 VITALS
BODY MASS INDEX: 41.39 KG/M2 | WEIGHT: 315 LBS | DIASTOLIC BLOOD PRESSURE: 84 MMHG | SYSTOLIC BLOOD PRESSURE: 146 MMHG | TEMPERATURE: 97.7 F

## 2022-07-28 DIAGNOSIS — I10 PRIMARY HYPERTENSION: ICD-10-CM

## 2022-07-28 DIAGNOSIS — E66.01 OBESITY, MORBID (MORE THAN 100 LBS OVER IDEAL WEIGHT OR BMI > 40) (HCC): Primary | ICD-10-CM

## 2022-07-28 DIAGNOSIS — G62.9 PERIPHERAL POLYNEUROPATHY: ICD-10-CM

## 2022-07-28 DIAGNOSIS — E78.1 PURE HYPERTRIGLYCERIDEMIA: ICD-10-CM

## 2022-07-28 DIAGNOSIS — G47.62 NOCTURNAL LEG CRAMPS: ICD-10-CM

## 2022-07-28 PROCEDURE — 4004F PT TOBACCO SCREEN RCVD TLK: CPT | Performed by: FAMILY MEDICINE

## 2022-07-28 PROCEDURE — 3017F COLORECTAL CA SCREEN DOC REV: CPT | Performed by: FAMILY MEDICINE

## 2022-07-28 PROCEDURE — G8417 CALC BMI ABV UP PARAM F/U: HCPCS | Performed by: FAMILY MEDICINE

## 2022-07-28 PROCEDURE — G8427 DOCREV CUR MEDS BY ELIG CLIN: HCPCS | Performed by: FAMILY MEDICINE

## 2022-07-28 PROCEDURE — 99214 OFFICE O/P EST MOD 30 MIN: CPT | Performed by: FAMILY MEDICINE

## 2022-07-28 RX ORDER — LISINOPRIL 20 MG/1
20 TABLET ORAL DAILY
Qty: 90 TABLET | Refills: 1 | Status: SHIPPED | OUTPATIENT
Start: 2022-07-28

## 2022-07-28 ASSESSMENT — ENCOUNTER SYMPTOMS
BACK PAIN: 1
SHORTNESS OF BREATH: 1
CHEST TIGHTNESS: 0

## 2022-07-28 NOTE — PROGRESS NOTES
SUBJECTIVE:    Rod Magallon is a 46 y.o. male who presents for a follow up visit. Chief Complaint   Patient presents with    Follow-up     Patient is here for a follow up. C/O cramps in legs, lasting 10-15 minutes at a time. Neuropathy getting worse, has had some recent falls. Ankle swelling. Hypertension  This is a chronic problem. The current episode started more than 1 year ago. The problem has been waxing and waning since onset. The problem is uncontrolled. Associated symptoms include malaise/fatigue, peripheral edema and shortness of breath. Pertinent negatives include no chest pain. Agents associated with hypertension include NSAIDs. Risk factors for coronary artery disease include male gender, obesity, sedentary lifestyle, dyslipidemia and smoking/tobacco exposure. Patient's medications, allergies, past medical,surgical, social and family histories were reviewed and updated as appropriate. Past Medical History:   Diagnosis Date    Chronic back pain     Hyperlipidemia     Hypertension     Neuropathy     Obesity      No past surgical history on file. Family History   Problem Relation Age of Onset    Diabetes Maternal Grandmother     Diabetes Father          in early 62s, ? cause     Social History     Tobacco Use    Smoking status: Every Day     Packs/day: 1.00     Types: Cigarettes    Smokeless tobacco: Never   Substance Use Topics    Alcohol use: No      No Known Allergies  Current Outpatient Medications on File Prior to Visit   Medication Sig Dispense Refill    diclofenac (VOLTAREN) 50 MG EC tablet Take 1 tablet by mouth 2 times daily 60 tablet 1    cyclobenzaprine (FLEXERIL) 10 MG tablet Take 1 tablet by mouth nightly as needed for Muscle spasms 90 tablet 1    aspirin-acetaminophen-caffeine (EXCEDRIN MIGRAINE) 250-250-65 MG per tablet Take by mouth As needed for HA      gabapentin (NEURONTIN) 300 MG capsule Take 1 capsule by mouth 3 times daily for 30 days.  180 capsule 1 No current facility-administered medications on file prior to visit. Review of Systems   Constitutional:  Positive for fatigue and malaise/fatigue. Negative for activity change. HENT:  Negative for congestion. Respiratory:  Positive for shortness of breath. Negative for chest tightness. Cardiovascular:  Negative for chest pain. Musculoskeletal:  Positive for arthralgias and back pain. Psychiatric/Behavioral:  Positive for sleep disturbance (Having frequent nocturnal leg cramps). OBJECTIVE:    BP (!) 146/84   Temp 97.7 °F (36.5 °C)   Wt (!) 335 lb (152 kg)   BMI 41.39 kg/m²    Vitals:    07/28/22 0730 07/28/22 0751   BP: (!) 160/90 (!) 146/84   Temp: 97.7 °F (36.5 °C)    Weight: (!) 335 lb (152 kg)        Physical Exam  Constitutional:       General: He is not in acute distress. HENT:      Head: Normocephalic and atraumatic. Right Ear: Tympanic membrane normal.      Left Ear: Tympanic membrane normal.      Nose: Nose normal. No rhinorrhea. Mouth/Throat:      Mouth: Mucous membranes are moist.      Pharynx: No posterior oropharyngeal erythema. Cardiovascular:      Rate and Rhythm: Normal rate and regular rhythm. Heart sounds: Normal heart sounds. Pulmonary:      Effort: Pulmonary effort is normal.      Breath sounds: Normal breath sounds. Musculoskeletal:      Cervical back: Normal range of motion and neck supple. Right lower leg: Edema present. Left lower leg: Edema present. Neurological:      Mental Status: He is alert. Psychiatric:         Mood and Affect: Mood normal.         Behavior: Behavior normal.       ASSESSMENT/PLAN:    Margarita Bautista was seen today for follow-up. Diagnoses and all orders for this visit:    Obesity, morbid (more than 100 lbs over ideal weight or BMI > 40) ([facility])    Peripheral polyneuropathy  Patient will continue gabapentin 300 mg 3 times a day. Once again he is encouraged strongly to stop smoking.     Nocturnal leg cramps  Handouts given on treatment of nocturnal leg cramps. Pure hypertriglyceridemia  Continue current meds and encouraged to decrease carbohydrate intake and try to lose weight. Patient has an order for lab work and will be going today to have it done. This includes CMP CBC and lipids. Primary hypertension  Patient's Norvasc is discontinued in hopes of helping his pedal edema. His blood pressure is in need of better control and we will increase his lisinopril from 10 mg daily to 20 mg daily. -     lisinopril (PRINIVIL;ZESTRIL) 20 MG tablet; Take 1 tablet by mouth in the morning. Return in about 4 weeks (around 8/25/2022). Please note portions of this note were completed with a voicerecognition program.  Efforts were made to edit the dictations but occasionally words are mis-transcribed.

## 2022-08-11 RX ORDER — METHYLPREDNISOLONE 4 MG/1
TABLET ORAL
Qty: 21 TABLET | OUTPATIENT
Start: 2022-08-11

## 2022-08-11 RX ORDER — TRAMADOL HYDROCHLORIDE 50 MG/1
TABLET ORAL
Qty: 20 TABLET | OUTPATIENT
Start: 2022-08-11

## 2022-08-11 NOTE — TELEPHONE ENCOUNTER
S/W pt, who states he did not request refills of this medication and does not need it at this time. Pt no showed recent appt on 7/20/22. Sched F/U for pt on 8/23/22.

## 2022-08-18 ENCOUNTER — TELEMEDICINE (OUTPATIENT)
Dept: PULMONOLOGY | Age: 52
End: 2022-08-18
Payer: COMMERCIAL

## 2022-08-18 DIAGNOSIS — E66.01 OBESITY, MORBID (MORE THAN 100 LBS OVER IDEAL WEIGHT OR BMI > 40) (HCC): Chronic | ICD-10-CM

## 2022-08-18 DIAGNOSIS — R06.83 SNORING: ICD-10-CM

## 2022-08-18 DIAGNOSIS — I10 PRIMARY HYPERTENSION: Chronic | ICD-10-CM

## 2022-08-18 DIAGNOSIS — G47.10 HYPERSOMNIA: Primary | ICD-10-CM

## 2022-08-18 PROBLEM — G62.9 PERIPHERAL POLYNEUROPATHY: Chronic | Status: ACTIVE | Noted: 2021-10-01

## 2022-08-18 PROBLEM — E78.1 PURE HYPERTRIGLYCERIDEMIA: Chronic | Status: ACTIVE | Noted: 2021-10-01

## 2022-08-18 PROCEDURE — 99204 OFFICE O/P NEW MOD 45 MIN: CPT | Performed by: INTERNAL MEDICINE

## 2022-08-18 PROCEDURE — G8427 DOCREV CUR MEDS BY ELIG CLIN: HCPCS | Performed by: INTERNAL MEDICINE

## 2022-08-18 RX ORDER — AMLODIPINE BESYLATE 5 MG/1
TABLET ORAL
COMMUNITY
Start: 2022-07-30 | End: 2022-08-28

## 2022-08-18 ASSESSMENT — SLEEP AND FATIGUE QUESTIONNAIRES
HOW LIKELY ARE YOU TO NOD OFF OR FALL ASLEEP WHILE SITTING QUIETLY AFTER LUNCH WITHOUT ALCOHOL: 2
HOW LIKELY ARE YOU TO NOD OFF OR FALL ASLEEP WHILE LYING DOWN TO REST IN THE AFTERNOON WHEN CIRCUMSTANCES PERMIT: 2
ESS TOTAL SCORE: 6
HOW LIKELY ARE YOU TO NOD OFF OR FALL ASLEEP WHILE SITTING AND TALKING TO SOMEONE: 0
HOW LIKELY ARE YOU TO NOD OFF OR FALL ASLEEP WHILE WATCHING TV: 1
HOW LIKELY ARE YOU TO NOD OFF OR FALL ASLEEP WHILE SITTING INACTIVE IN A PUBLIC PLACE: 0
HOW LIKELY ARE YOU TO NOD OFF OR FALL ASLEEP IN A CAR, WHILE STOPPED FOR A FEW MINUTES IN TRAFFIC: 0
HOW LIKELY ARE YOU TO NOD OFF OR FALL ASLEEP WHEN YOU ARE A PASSENGER IN A CAR FOR AN HOUR WITHOUT A BREAK: 1
HOW LIKELY ARE YOU TO NOD OFF OR FALL ASLEEP WHILE SITTING AND READING: 0

## 2022-08-18 ASSESSMENT — ENCOUNTER SYMPTOMS
ABDOMINAL DISTENTION: 0
EYE PAIN: 0
PHOTOPHOBIA: 0
ABDOMINAL PAIN: 0
NAUSEA: 0
BACK PAIN: 1
CHEST TIGHTNESS: 0
RHINORRHEA: 0
APNEA: 0
CHOKING: 0
VOMITING: 0
SHORTNESS OF BREATH: 0
ALLERGIC/IMMUNOLOGIC NEGATIVE: 1

## 2022-08-18 NOTE — LETTER
[facility]  [place] [address] [place] [address]  Phone: [phone]  Fax: [phone]           Conor Jaimes MD      August 18, 2022     Patient: Dany Segal   MR Number: [id number]   YOB: 1970   Date of Visit: 8/18/2022       Dear Dr. Kelvin Hollis: Thank you for referring Dany Segal to me for evaluation/treatment. Below are the relevant portions of my assessment and plan of care. Visit Diagnoses and Associated Orders       Hypersomnia   (New Problem)  -  Primary    needs work-up    POLYSOMNOGRAPHY (PSG) - Diagnostic Testing [93761 Custom]   - Future Order         Snoring   (New Problem)      needs work-up    POLYSOMNOGRAPHY (PSG) - Diagnostic Testing [33691 Custom]   - Future Order         Primary hypertension   (Stable)      amLODIPine (NORVASC) 5 MG tablet [9094]           Obesity, morbid (more than 100 lbs over ideal weight or BMI > 40) (HCC)   (Not Stable)                   One or more undiagnosed new problem with uncertain prognosis till final diagnosis is made. Differential diagnosis includes but not limited to: DARIELA, PLMD's, narcolepsy, parasomnias. Reviewed DARIELA (which is the highest likelihood diagnosis): pathophysiology, diagnosis, complications and treatment. Instructed him not to drive if drowsy. Continue medications per his PCP and other physicians. Limit caffeine use after 3pm. Will do PSG to rule-out DARIELA and other sleep disorders. 1 wk follow up after study to review his results. The chronic medical conditions listed are directly related to the primary diagnosis listed above. The management of the primary diagnosis affects the secondary diagnosis and vice versa. This information was analyzed to assess complexity and medical decision making in regards to further testing and management. Continue meds for: hypertension . Pt would medically benefit from wt loss for DARIELA (diet, exercise, surgical).  Encouraged to quit tobacco in all forms, discussed different techniques to quit. Orders Placed This Encounter   Procedures    POLYSOMNOGRAPHY (PSG) - Diagnostic Testing       If you have questions, please do not hesitate to call me. I look forward to following Jose ISAACS along with you.     Sincerely,        Lina Rodriguez MD    CC providers:  Hung Noel MD  [address] [facility] [address] [place] [address]

## 2022-08-18 NOTE — PROGRESS NOTES
Asa Baer [facility]  [address] [facility], [address] [facility] [phone]   [facility] Devorah Kyle. [address] [place] [address]. Mary Malcolm [address] [phone]     Video Visit- [facility], was evaluated through a synchronous (real-time) audio-video  encounter. The patient (or guardian if applicable) is aware that this is a billable  service, which includes applicable co-pays. This Virtual Visit was conducted with  patient's (and/or legal guardian's) consent. The visit was conducted pursuant to  the emergency declaration under the [facility], [address] [place] [address] waiver authority and the [employer] and  [employer] General Act. Patient identification was verified,  and a caregiver was present when appropriate. The patient was located in a  state where the provider was licensed to provide care. Assessment:      Visit Diagnoses and Associated Orders       Hypersomnia   (New Problem)  -  Primary    needs work-up    POLYSOMNOGRAPHY (PSG) - Diagnostic Testing [15432 Custom]   - Future Order         Snoring   (New Problem)      needs work-up    POLYSOMNOGRAPHY (PSG) - Diagnostic Testing [07499 Custom]   - Future Order         Primary hypertension   (Stable)      amLODIPine (NORVASC) 5 MG tablet [9071]           Obesity, morbid (more than 100 lbs over ideal weight or BMI > 40) (HCC)   (Not Stable)                    Plan:      One or more undiagnosed new problem with uncertain prognosis till final diagnosis is made. Differential diagnosis includes but not limited to: DARIELA, PLMD's, narcolepsy, parasomnias. Reviewed DARIELA (which is the highest likelihood diagnosis): pathophysiology, diagnosis, complications and treatment. Instructed him not to drive if drowsy. Continue medications per his PCP and other physicians. Limit caffeine use after 3pm. Will do PSG to rule-out DARIELA and other sleep disorders.  1 wk follow up after study to review his results. The chronic medical conditions listed are directly related to the primary diagnosis listed above. The management of the primary diagnosis affects the secondary diagnosis and vice versa. This information was analyzed to assess complexity and medical decision making in regards to further testing and management. Continue meds for: hypertension . Pt would medically benefit from wt loss for DARIELA (diet, exercise, surgical). Encouraged to quit tobacco in all forms, discussed different techniques to quit. Orders Placed This Encounter   Procedures    POLYSOMNOGRAPHY (PSG) - Diagnostic Testing          Subjective:     Patient ID: Collins Dakins is a 46 y.o. male. Chief Complaint   Patient presents with    Daytime Sleepiness    Sleep Apnea       HPI:      Collins Dakins is a 46 y.o. male referred by Walter Whaley* for a sleep evaluation. He complains of: snoring, excessive daytime sleepiness , non-restorative sleep, napping, and tossing and turning at night. He denies: cataplexy and hypnagogic hallucinations. Symptoms began several years ago, gradually worsening since that time. Has trouble maintaining sleep. Previous evaluation and treatment has included- None     Chronic stable medical conditions: HTN  Chronic unstable medical conditions: obesity    DOT/CDL - no  FAA/'s license -no    Previous Report(s) Reviewed: historical medical records, office notes, and referral letter(s). Pertinent data has been documented.     [place] - Total score: 6    Caffeine Intake - Pop/Soda: 3-4 bottle(s) per day, will drink Mt Dew all the way to 11 pm    Social History     Socioeconomic History    Marital status: Single     Spouse name: Not on file    Number of children: Not on file    Years of education: Not on file    Highest education level: Not on file   Occupational History    Not on file   Tobacco Use    Smoking status: Every Day     Packs/day: 1.00     Years: 30.00     Pack years: 30.00 Types: Cigarettes    Smokeless tobacco: Never   Vaping Use    Vaping Use: Every day   Substance and Sexual Activity    Alcohol use: No    Drug use: No    Sexual activity: Yes   Other Topics Concern    Not on file   Social History Narrative    Not on file     Social Determinants of Health     Financial Resource Strain: Not on file   Food Insecurity: Not on file   Transportation Needs: Not on file   Physical Activity: Not on file   Stress: Not on file   Social Connections: Not on file   Intimate Partner Violence: Not on file   Housing Stability: Not on file        Current Outpatient Medications   Medication Instructions    amLODIPine (NORVASC) 5 MG tablet No dose, route, or frequency recorded. aspirin-acetaminophen-caffeine (EXCEDRIN MIGRAINE) 250-250-65 MG per tablet Oral, As needed for HA    cyclobenzaprine (FLEXERIL) 10 mg, Oral, NIGHTLY PRN    diclofenac (VOLTAREN) 50 mg, Oral, 2 TIMES DAILY    gabapentin (NEURONTIN) 300 mg, Oral, 3 TIMES DAILY    lisinopril (PRINIVIL;ZESTRIL) 20 mg, Oral, DAILY       Allergies as of 2022    (No Known Allergies)       Patient Active Problem List   Diagnosis    Obesity, morbid (more than 100 lbs over ideal weight or BMI > 40) ([facility])    Persistent headaches    Dyspnea    Knee pain, left    Abnormal CXR    Pure hypertriglyceridemia    Peripheral polyneuropathy    Primary hypertension    Nocturnal leg cramps       Past Medical History:   Diagnosis Date    Chronic back pain     Hyperlipidemia     Hypertension     Neuropathy     Obesity        History reviewed. No pertinent surgical history. Family History   Problem Relation Age of Onset    Diabetes Maternal Grandmother     Diabetes Father          in early 62s, ? cause       Review of Systems   Constitutional:  Positive for fatigue. Negative for activity change and appetite change. HENT:  Positive for congestion. Negative for nosebleeds, postnasal drip, rhinorrhea and sneezing.     Eyes:  Negative for photophobia, pain and visual disturbance. Respiratory:  Negative for apnea, choking, chest tightness and shortness of breath. Cardiovascular: Negative. Gastrointestinal:  Negative for abdominal distention, abdominal pain, nausea and vomiting. Endocrine: Negative for cold intolerance and heat intolerance. Genitourinary:  Negative for difficulty urinating, dysuria, frequency and urgency. Musculoskeletal:  Positive for back pain. Negative for neck pain and neck stiffness. Skin: Negative. Allergic/Immunologic: Negative. Neurological:  Positive for headaches. Negative for tremors, seizures, syncope and weakness. Hematological:  Negative for adenopathy. Does not bruise/bleed easily. Psychiatric/Behavioral:  Positive for sleep disturbance. Negative for agitation, behavioral problems and confusion. Objective:     Vitals:  Patient reported Height and Weight Calculated BMI   Patient-Reported Vitals 8/18/2022   Patient-Reported Weight 325 lb   Patient-Reported Height 6.1'       42.9     Due to COVID-19 this was a virtual visit and physical exam was deferred.     Electronically signed by Bryant Roberts MD on8/18/2022 at 1:46 PM

## 2022-12-29 DIAGNOSIS — G62.9 NEUROPATHY: ICD-10-CM

## 2022-12-30 RX ORDER — GABAPENTIN 300 MG/1
CAPSULE ORAL
Qty: 90 CAPSULE | Refills: 1 | Status: SHIPPED | OUTPATIENT
Start: 2022-12-30 | End: 2023-01-29

## 2023-01-13 DIAGNOSIS — I10 PRIMARY HYPERTENSION: ICD-10-CM

## 2023-01-16 RX ORDER — AMLODIPINE BESYLATE 5 MG/1
TABLET ORAL
Qty: 30 TABLET | Refills: 3 | OUTPATIENT
Start: 2023-01-16

## 2023-03-13 DIAGNOSIS — G62.9 NEUROPATHY: ICD-10-CM

## 2023-03-14 RX ORDER — GABAPENTIN 300 MG/1
CAPSULE ORAL
Qty: 90 CAPSULE | Refills: 1 | Status: SHIPPED | OUTPATIENT
Start: 2023-03-14 | End: 2023-04-13

## 2023-03-15 ENCOUNTER — TELEPHONE (OUTPATIENT)
Dept: ORTHOPEDIC SURGERY | Age: 53
End: 2023-03-15

## 2023-03-15 RX ORDER — METHYLPREDNISOLONE 4 MG/1
TABLET ORAL
Qty: 21 TABLET | OUTPATIENT
Start: 2023-03-15

## 2023-03-15 RX ORDER — TRAMADOL HYDROCHLORIDE 50 MG/1
TABLET ORAL
Qty: 20 TABLET | OUTPATIENT
Start: 2023-03-15

## 2023-03-16 DIAGNOSIS — G62.9 NEUROPATHY: ICD-10-CM

## 2023-03-16 RX ORDER — GABAPENTIN 300 MG/1
CAPSULE ORAL
Qty: 90 CAPSULE | Refills: 1 | OUTPATIENT
Start: 2023-03-16 | End: 2023-04-15

## 2023-04-04 ENCOUNTER — OFFICE VISIT (OUTPATIENT)
Dept: FAMILY MEDICINE CLINIC | Age: 53
End: 2023-04-04
Payer: COMMERCIAL

## 2023-04-04 VITALS
DIASTOLIC BLOOD PRESSURE: 82 MMHG | RESPIRATION RATE: 20 BRPM | SYSTOLIC BLOOD PRESSURE: 140 MMHG | BODY MASS INDEX: 37.64 KG/M2 | HEART RATE: 58 BPM | TEMPERATURE: 97.3 F | OXYGEN SATURATION: 96 % | WEIGHT: 304.6 LBS

## 2023-04-04 DIAGNOSIS — G89.29 CHRONIC BILATERAL LOW BACK PAIN WITH SCIATICA, SCIATICA LATERALITY UNSPECIFIED: ICD-10-CM

## 2023-04-04 DIAGNOSIS — M25.512 BILATERAL SHOULDER PAIN, UNSPECIFIED CHRONICITY: ICD-10-CM

## 2023-04-04 DIAGNOSIS — M25.552 PAIN OF LEFT HIP: Primary | ICD-10-CM

## 2023-04-04 DIAGNOSIS — M54.40 CHRONIC BILATERAL LOW BACK PAIN WITH SCIATICA, SCIATICA LATERALITY UNSPECIFIED: ICD-10-CM

## 2023-04-04 DIAGNOSIS — M25.511 BILATERAL SHOULDER PAIN, UNSPECIFIED CHRONICITY: ICD-10-CM

## 2023-04-04 PROCEDURE — 3079F DIAST BP 80-89 MM HG: CPT | Performed by: FAMILY MEDICINE

## 2023-04-04 PROCEDURE — 3017F COLORECTAL CA SCREEN DOC REV: CPT | Performed by: FAMILY MEDICINE

## 2023-04-04 PROCEDURE — 3077F SYST BP >= 140 MM HG: CPT | Performed by: FAMILY MEDICINE

## 2023-04-04 PROCEDURE — G8427 DOCREV CUR MEDS BY ELIG CLIN: HCPCS | Performed by: FAMILY MEDICINE

## 2023-04-04 PROCEDURE — 4004F PT TOBACCO SCREEN RCVD TLK: CPT | Performed by: FAMILY MEDICINE

## 2023-04-04 PROCEDURE — G8417 CALC BMI ABV UP PARAM F/U: HCPCS | Performed by: FAMILY MEDICINE

## 2023-04-04 PROCEDURE — 99213 OFFICE O/P EST LOW 20 MIN: CPT | Performed by: FAMILY MEDICINE

## 2023-04-04 RX ORDER — CYCLOBENZAPRINE HCL 10 MG
10 TABLET ORAL NIGHTLY PRN
Qty: 90 TABLET | Refills: 1 | Status: SHIPPED | OUTPATIENT
Start: 2023-04-04

## 2023-04-04 SDOH — ECONOMIC STABILITY: INCOME INSECURITY: HOW HARD IS IT FOR YOU TO PAY FOR THE VERY BASICS LIKE FOOD, HOUSING, MEDICAL CARE, AND HEATING?: NOT VERY HARD

## 2023-04-04 SDOH — ECONOMIC STABILITY: FOOD INSECURITY: WITHIN THE PAST 12 MONTHS, YOU WORRIED THAT YOUR FOOD WOULD RUN OUT BEFORE YOU GOT MONEY TO BUY MORE.: NEVER TRUE

## 2023-04-04 SDOH — ECONOMIC STABILITY: HOUSING INSECURITY
IN THE LAST 12 MONTHS, WAS THERE A TIME WHEN YOU DID NOT HAVE A STEADY PLACE TO SLEEP OR SLEPT IN A SHELTER (INCLUDING NOW)?: NO

## 2023-04-04 SDOH — ECONOMIC STABILITY: FOOD INSECURITY: WITHIN THE PAST 12 MONTHS, THE FOOD YOU BOUGHT JUST DIDN'T LAST AND YOU DIDN'T HAVE MONEY TO GET MORE.: NEVER TRUE

## 2023-04-04 ASSESSMENT — PATIENT HEALTH QUESTIONNAIRE - PHQ9
SUM OF ALL RESPONSES TO PHQ9 QUESTIONS 1 & 2: 0
SUM OF ALL RESPONSES TO PHQ QUESTIONS 1-9: 0
SUM OF ALL RESPONSES TO PHQ QUESTIONS 1-9: 0
1. LITTLE INTEREST OR PLEASURE IN DOING THINGS: 0
2. FEELING DOWN, DEPRESSED OR HOPELESS: 0
SUM OF ALL RESPONSES TO PHQ QUESTIONS 1-9: 0
SUM OF ALL RESPONSES TO PHQ QUESTIONS 1-9: 0

## 2023-04-04 ASSESSMENT — ENCOUNTER SYMPTOMS: BACK PAIN: 1

## 2023-04-04 NOTE — PROGRESS NOTES
(VOLTAREN) 50 MG EC tablet; Take 1 tablet by mouth 2 times daily    Bilateral shoulder pain, unspecified chronicity  -     [place] - Cruz Gu MD, Orthopedics and Sports Medicine (Knee; Shoulder; Elbow; Hip; Trauma), [facility]  -     diclofenac (VOLTAREN) 50 MG EC tablet; Take 1 tablet by mouth 2 times daily    Chronic bilateral low back pain with sciatica, sciatica laterality unspecified  -     cyclobenzaprine (FLEXERIL) 10 MG tablet; Take 1 tablet by mouth nightly as needed for Muscle spasms        Return in about 6 months (around 10/4/2023). Please note portions of this note were completed with a voicerecognition program.  Efforts were made to edit the dictations but occasionally words are mis-transcribed.

## 2023-05-02 ENCOUNTER — OFFICE VISIT (OUTPATIENT)
Dept: ORTHOPEDIC SURGERY | Age: 53
End: 2023-05-02

## 2023-05-02 VITALS — RESPIRATION RATE: 16 BRPM | BODY MASS INDEX: 41.35 KG/M2 | WEIGHT: 312 LBS | HEIGHT: 73 IN

## 2023-05-02 DIAGNOSIS — M25.511 ACUTE PAIN OF BOTH SHOULDERS: Primary | ICD-10-CM

## 2023-05-02 DIAGNOSIS — M75.82 TENDINITIS OF BOTH ROTATOR CUFFS: ICD-10-CM

## 2023-05-02 DIAGNOSIS — M75.81 TENDINITIS OF BOTH ROTATOR CUFFS: ICD-10-CM

## 2023-05-02 DIAGNOSIS — M25.512 ACUTE PAIN OF BOTH SHOULDERS: Primary | ICD-10-CM

## 2023-05-02 RX ORDER — TRIAMCINOLONE ACETONIDE 40 MG/ML
40 INJECTION, SUSPENSION INTRA-ARTICULAR; INTRAMUSCULAR ONCE
Status: COMPLETED | OUTPATIENT
Start: 2023-05-02 | End: 2023-05-02

## 2023-05-02 RX ORDER — BUPIVACAINE HYDROCHLORIDE 2.5 MG/ML
4 INJECTION, SOLUTION INFILTRATION; PERINEURAL ONCE
Status: COMPLETED | OUTPATIENT
Start: 2023-05-02 | End: 2023-05-02

## 2023-05-02 RX ORDER — LIDOCAINE HYDROCHLORIDE 10 MG/ML
4 INJECTION, SOLUTION INFILTRATION; PERINEURAL ONCE
Status: COMPLETED | OUTPATIENT
Start: 2023-05-02 | End: 2023-05-02

## 2023-05-02 RX ADMIN — TRIAMCINOLONE ACETONIDE 40 MG: 40 INJECTION, SUSPENSION INTRA-ARTICULAR; INTRAMUSCULAR at 09:24

## 2023-05-02 RX ADMIN — LIDOCAINE HYDROCHLORIDE 4 ML: 10 INJECTION, SOLUTION INFILTRATION; PERINEURAL at 09:23

## 2023-05-02 RX ADMIN — BUPIVACAINE HYDROCHLORIDE 10 MG: 2.5 INJECTION, SOLUTION INFILTRATION; PERINEURAL at 09:22

## 2023-05-02 RX ADMIN — BUPIVACAINE HYDROCHLORIDE 10 MG: 2.5 INJECTION, SOLUTION INFILTRATION; PERINEURAL at 09:23

## 2023-05-02 NOTE — PROGRESS NOTES
betadine solution and alcohol. Under aseptic conditions, the  bilateral subacromial space was injected with 4cc of 1% xylocaine, 4cc of 0.25% marcaine, and 1cc of Kenalog (40mg/ml). There were no immediate complications following the injection. The patient was advised of the possibility of injection site reaction and instructed to apply ice to the area and take NSAIDs if able. Follow up in 2 months. Call IF NO improvement with PT after 4-6 weeks and I will order MRI prior to patient being seen back in office. Jessica Naranjo. Jaron Edwards MD  Orthopaedic Surgery and Sports Medicine     Disclaimer: This note was generated with use of a verbal recognition program and an attempt was made to check for errors. It is possible that there are still dictated errors within this office note. If so, please bring any significant errors to my attention for an addendum. All efforts were made to ensure that this office note is accurate.

## 2023-06-01 ENCOUNTER — OFFICE VISIT (OUTPATIENT)
Dept: FAMILY MEDICINE CLINIC | Age: 53
End: 2023-06-01
Payer: COMMERCIAL

## 2023-06-01 VITALS
HEART RATE: 67 BPM | DIASTOLIC BLOOD PRESSURE: 86 MMHG | TEMPERATURE: 97.6 F | SYSTOLIC BLOOD PRESSURE: 132 MMHG | WEIGHT: 313.8 LBS | RESPIRATION RATE: 16 BRPM | BODY MASS INDEX: 41.4 KG/M2 | OXYGEN SATURATION: 96 %

## 2023-06-01 DIAGNOSIS — E78.1 PURE HYPERTRIGLYCERIDEMIA: ICD-10-CM

## 2023-06-01 DIAGNOSIS — I10 PRIMARY HYPERTENSION: Chronic | ICD-10-CM

## 2023-06-01 DIAGNOSIS — R73.9 HYPERGLYCEMIA: ICD-10-CM

## 2023-06-01 DIAGNOSIS — G43.009 MIGRAINE WITHOUT AURA AND WITHOUT STATUS MIGRAINOSUS, NOT INTRACTABLE: Primary | ICD-10-CM

## 2023-06-01 DIAGNOSIS — G62.9 NEUROPATHY: ICD-10-CM

## 2023-06-01 PROCEDURE — G8427 DOCREV CUR MEDS BY ELIG CLIN: HCPCS | Performed by: FAMILY MEDICINE

## 2023-06-01 PROCEDURE — 4004F PT TOBACCO SCREEN RCVD TLK: CPT | Performed by: FAMILY MEDICINE

## 2023-06-01 PROCEDURE — G8417 CALC BMI ABV UP PARAM F/U: HCPCS | Performed by: FAMILY MEDICINE

## 2023-06-01 PROCEDURE — 99214 OFFICE O/P EST MOD 30 MIN: CPT | Performed by: FAMILY MEDICINE

## 2023-06-01 PROCEDURE — 3079F DIAST BP 80-89 MM HG: CPT | Performed by: FAMILY MEDICINE

## 2023-06-01 PROCEDURE — 3017F COLORECTAL CA SCREEN DOC REV: CPT | Performed by: FAMILY MEDICINE

## 2023-06-01 PROCEDURE — 3075F SYST BP GE 130 - 139MM HG: CPT | Performed by: FAMILY MEDICINE

## 2023-06-01 RX ORDER — TOPIRAMATE 25 MG/1
25 TABLET ORAL 2 TIMES DAILY
Qty: 180 TABLET | Refills: 1 | Status: SHIPPED | OUTPATIENT
Start: 2023-06-01

## 2023-06-01 RX ORDER — GABAPENTIN 300 MG/1
CAPSULE ORAL
Qty: 90 CAPSULE | Refills: 2 | Status: SHIPPED | OUTPATIENT
Start: 2023-06-01 | End: 2023-07-03

## 2023-06-01 ASSESSMENT — ENCOUNTER SYMPTOMS
SHORTNESS OF BREATH: 0
CONSTIPATION: 0
BACK PAIN: 1
DIARRHEA: 0
CHEST TIGHTNESS: 0

## 2023-06-01 NOTE — PROGRESS NOTES
SUBJECTIVE:    Britton Kumar is a 46 y.o. male who presents for a follow up visit. Chief Complaint   Patient presents with    Headache     Starting to get worse/severe- happens more when he wakes up in the mornings and ariund times of napping- has tried Excedrin but not helping         Headache  Headache pattern:  Headache sometimes there, sometimes not at all  Initial event:  None  Recent changes:  Headaches come more often than they used to  Frequency:  Daily  Providers seen:  None  Age of onset (years):  35  Lifetime total:  20+  Longest time without a headache:  Months  Number of ER visits for headache:  0  Number of hospitalizations for headaches:  0  Time of day symptoms are worse:  Upon waking up  Do headaches wake patient from sleep?: No    Days of the week symptoms are worse:  No specific day of the week  Season symptoms are worse:  No particular season  Quality:  Throbbing  Laterality:  Both sides at the same time  Location:  Behind eyes  Headaches last more than three days?: No    Aggravating factors:  None  Allodynia triggers:  None  Changes in thinking and mood:  None  Changes in vision:  None  Bilateral symptoms:  None  Unilateral symptoms:  None  Abortive medications tried:  Excedrin migraine/tension and diclofenac  Preventative medications tried:  Gabapentin  Alternative treatments tried:  Physical therapy  Hyperlipidemia  This is a chronic problem. The current episode started more than 1 year ago. Lipid results: Patient's most recent lipid profile was done in August 2021 and at that time total cholesterol was 132 triglycerides were elevated at 287, HDL low at 18 and LDL was at goal with a value of 57. Pertinent negatives include no chest pain or shortness of breath. He is currently on no antihyperlipidemic treatment. Compliance problems include adherence to exercise and adherence to diet.   Risk factors for coronary artery disease include dyslipidemia, male sex, obesity, hypertension and a

## 2023-06-09 DIAGNOSIS — G43.909 MIGRAINE WITHOUT STATUS MIGRAINOSUS, NOT INTRACTABLE, UNSPECIFIED MIGRAINE TYPE: Primary | ICD-10-CM

## 2023-06-09 RX ORDER — AMITRIPTYLINE HYDROCHLORIDE 25 MG/1
25 TABLET, FILM COATED ORAL NIGHTLY
Qty: 30 TABLET | Refills: 5 | Status: SHIPPED | OUTPATIENT
Start: 2023-06-09

## 2023-06-20 ENCOUNTER — OFFICE VISIT (OUTPATIENT)
Dept: NEUROLOGY | Age: 53
End: 2023-06-20
Payer: COMMERCIAL

## 2023-06-20 VITALS
DIASTOLIC BLOOD PRESSURE: 88 MMHG | BODY MASS INDEX: 41.48 KG/M2 | HEART RATE: 81 BPM | HEIGHT: 73 IN | SYSTOLIC BLOOD PRESSURE: 132 MMHG | WEIGHT: 313 LBS

## 2023-06-20 DIAGNOSIS — R51.9 NEW ONSET OF HEADACHES AFTER AGE 50: Primary | ICD-10-CM

## 2023-06-20 PROCEDURE — 3079F DIAST BP 80-89 MM HG: CPT | Performed by: PSYCHIATRY & NEUROLOGY

## 2023-06-20 PROCEDURE — 3075F SYST BP GE 130 - 139MM HG: CPT | Performed by: PSYCHIATRY & NEUROLOGY

## 2023-06-20 PROCEDURE — G8417 CALC BMI ABV UP PARAM F/U: HCPCS | Performed by: PSYCHIATRY & NEUROLOGY

## 2023-06-20 PROCEDURE — 99244 OFF/OP CNSLTJ NEW/EST MOD 40: CPT | Performed by: PSYCHIATRY & NEUROLOGY

## 2023-06-20 PROCEDURE — G8427 DOCREV CUR MEDS BY ELIG CLIN: HCPCS | Performed by: PSYCHIATRY & NEUROLOGY

## 2023-06-20 NOTE — PROGRESS NOTES
well nourished and in no distress  Mental Status Exam: Patient is alert, oriented to person, place and time. Recent and remote memory is normal  Fund of Knowledge is normal  Attention/concentration is normal.   Speech : No dysarthria  Language : No aphasia  Funduscopic Exam: sharp disc margins  Cranial Nerves:   II: Visual fields:  Full to confrontation  III: Pupils:  equal, round, reactive to light  III,IV,VI: Extra Ocular Movements are intact. No nystagmus  V: Facial sensation is intact to pin prick and light touch  VII: Facial strength and movements: intact and symmetric smile,cheek puffing and eyebrow elevation  VIII: Hearing:  Intact to finger rub bilaterally  IX: Palate  elevation is symmetric  XI: Shoulder shrug is intact  XII: Tongue movements are normal  Motor:  Muscle tone and bulk are normal.   Strength is symmetrical 5/5 in all four extremities. Sensory: Intact to light touch and  pin prick in all four extremities  Coordination:  Normal  Finger to Nose and Heel to Shin bilaterally    . Reflexes:  DTR +2 and symmetric bilaterally  Plantar response: Flexor bilaterally  Gait: Gait and station is normal. Patient can toe/ heel and tandem walk without difficulty  Romberg: negative  Vascular: No carotid bruit bilaterally      DATA:  LABS:  General Labs:  CBC:   Lab Results   Component Value Date/Time    WBC 13.1 09/23/2021 01:48 AM    RBC 5.47 09/23/2021 01:48 AM    HGB 15.9 09/23/2021 01:48 AM    HCT 46.9 09/23/2021 01:48 AM    MCV 85.8 09/23/2021 01:48 AM    MCH 29.2 09/23/2021 01:48 AM    MCHC 34.0 09/23/2021 01:48 AM    RDW 19.7 09/23/2021 01:48 AM     09/23/2021 01:48 AM    MPV 9.7 09/23/2021 01:48 AM     BMP:    Lab Results   Component Value Date/Time     09/23/2021 01:48 AM    K 3.7 09/23/2021 01:48 AM     09/23/2021 01:48 AM    CO2 26 09/23/2021 01:48 AM    BUN 16 09/23/2021 01:48 AM    LABALBU 4.0 09/23/2021 01:48 AM    CREATININE 0.8 09/23/2021 01:48 AM    CALCIUM 9.0 09/23/2021

## 2023-06-24 DIAGNOSIS — G89.29 CHRONIC MIDLINE LOW BACK PAIN WITHOUT SCIATICA: ICD-10-CM

## 2023-06-24 DIAGNOSIS — M54.50 CHRONIC MIDLINE LOW BACK PAIN WITHOUT SCIATICA: ICD-10-CM

## 2023-06-26 RX ORDER — PREDNISONE 10 MG/1
TABLET ORAL
Qty: 30 TABLET | Refills: 0 | OUTPATIENT
Start: 2023-06-26

## 2023-07-25 DIAGNOSIS — M25.512 BILATERAL SHOULDER PAIN, UNSPECIFIED CHRONICITY: ICD-10-CM

## 2023-07-25 DIAGNOSIS — M25.552 PAIN OF LEFT HIP: ICD-10-CM

## 2023-07-25 DIAGNOSIS — M25.511 BILATERAL SHOULDER PAIN, UNSPECIFIED CHRONICITY: ICD-10-CM

## 2023-08-04 ENCOUNTER — HOSPITAL ENCOUNTER (OUTPATIENT)
Dept: MRI IMAGING | Age: 53
Discharge: HOME | End: 2023-08-04
Attending: FAMILY MEDICINE
Payer: COMMERCIAL

## 2023-08-04 DIAGNOSIS — G43.009 MIGRAINE WITHOUT AURA AND WITHOUT STATUS MIGRAINOSUS, NOT INTRACTABLE: ICD-10-CM

## 2023-08-04 PROCEDURE — 70553 MRI BRAIN STEM W/O & W/DYE: CPT

## 2023-08-04 PROCEDURE — 6360000004 HC RX CONTRAST MEDICATION: Performed by: FAMILY MEDICINE

## 2023-08-04 PROCEDURE — A9577 INJ MULTIHANCE: HCPCS | Performed by: FAMILY MEDICINE

## 2023-08-04 RX ADMIN — GADOBENATE DIMEGLUMINE 20 ML: 529 INJECTION, SOLUTION INTRAVENOUS at 10:30

## 2023-08-10 ENCOUNTER — OFFICE VISIT (OUTPATIENT)
Dept: NEUROLOGY | Age: 53
End: 2023-08-10

## 2023-08-10 VITALS
BODY MASS INDEX: 41.48 KG/M2 | SYSTOLIC BLOOD PRESSURE: 220 MMHG | HEART RATE: 71 BPM | HEIGHT: 73 IN | DIASTOLIC BLOOD PRESSURE: 117 MMHG | WEIGHT: 313 LBS

## 2023-08-10 DIAGNOSIS — G43.019 INTRACTABLE MIGRAINE WITHOUT AURA AND WITHOUT STATUS MIGRAINOSUS: Primary | ICD-10-CM

## 2023-08-10 RX ORDER — TOPIRAMATE 50 MG/1
25 TABLET, FILM COATED ORAL 2 TIMES DAILY
Qty: 180 TABLET | Refills: 0 | Status: SHIPPED | OUTPATIENT
Start: 2023-08-10

## 2023-08-10 NOTE — PROGRESS NOTES
Fitz Gonzales   Neurology followup    Subjective:   CC/HP  History was obtained from the patient. Patient has migraine headaches. He is here for a follow-up visit. He states that the headaches are still not completely controlled. She is unable to tolerate amitriptyline because of insomnia as a side effect. She is on topiramate 25 mg twice daily. She states her headaches are somewhat better after the MRI brain scan. Patient denies any snoring or daytime sleepiness.   REVIEW OF SYSTEMS    Constitutional:  []   Chills   []  Fatigue   []  Fevers   []  Malaise   []  Weight loss     [x] Denies all of the above    Respiratory:   []  Cough    []  Shortness of breath         [x] Denies all of the above     Cardiovascular:   []  Chest pain    []  Exertional chest pressure/discomfort           [] Palpitations    []  Syncope     [x] Denies all of the above        Past Medical History:   Diagnosis Date    Chronic back pain     Hyperlipidemia     Hypertension     Neuropathy     Obesity      Family History   Problem Relation Age of Onset    Diabetes Maternal Grandmother     Diabetes Father          in early 62s, ? cause     Social History     Socioeconomic History    Marital status:      Spouse name: None    Number of children: None    Years of education: None    Highest education level: None   Tobacco Use    Smoking status: Every Day     Packs/day: 1.00     Years: 30.00     Pack years: 30.00     Types: Cigarettes    Smokeless tobacco: Never   Vaping Use    Vaping Use: Every day   Substance and Sexual Activity    Alcohol use: No    Drug use: No    Sexual activity: Yes     Social Determinants of Health     Financial Resource Strain: Low Risk     Difficulty of Paying Living Expenses: Not very hard   Food Insecurity: No Food Insecurity    Worried About Running Out of Food in the Last Year: Never true    Ran Out of Food in the Last Year: Never true   Transportation Needs: Unknown    Lack of Transportation

## 2023-08-22 ENCOUNTER — OFFICE VISIT (OUTPATIENT)
Dept: ORTHOPEDIC SURGERY | Age: 53
End: 2023-08-22

## 2023-08-22 VITALS — HEIGHT: 73 IN | RESPIRATION RATE: 16 BRPM | BODY MASS INDEX: 41.75 KG/M2 | WEIGHT: 315 LBS

## 2023-08-22 DIAGNOSIS — M75.82 TENDINITIS OF BOTH ROTATOR CUFFS: Primary | ICD-10-CM

## 2023-08-22 DIAGNOSIS — M25.511 ACUTE PAIN OF BOTH SHOULDERS: ICD-10-CM

## 2023-08-22 DIAGNOSIS — M25.512 ACUTE PAIN OF BOTH SHOULDERS: ICD-10-CM

## 2023-08-22 DIAGNOSIS — M75.81 TENDINITIS OF BOTH ROTATOR CUFFS: Primary | ICD-10-CM

## 2023-08-22 RX ORDER — METHYLPREDNISOLONE 4 MG/1
TABLET ORAL
Qty: 1 KIT | Refills: 0 | Status: SHIPPED | OUTPATIENT
Start: 2023-08-22 | End: 2023-08-28

## 2023-08-22 NOTE — PROGRESS NOTES
Disclaimer: This note was generated with use of a verbal recognition program and an attempt was made to check for errors. It is possible that there are still dictated errors within this office note. If so, please bring any significant errors to my attention for an addendum. All efforts were made to ensure that this office note is accurate.

## 2023-09-06 ENCOUNTER — OFFICE VISIT (OUTPATIENT)
Dept: FAMILY MEDICINE CLINIC | Age: 53
End: 2023-09-06
Payer: COMMERCIAL

## 2023-09-06 VITALS
OXYGEN SATURATION: 97 % | BODY MASS INDEX: 43.64 KG/M2 | DIASTOLIC BLOOD PRESSURE: 86 MMHG | SYSTOLIC BLOOD PRESSURE: 138 MMHG | WEIGHT: 315 LBS | RESPIRATION RATE: 20 BRPM | TEMPERATURE: 97.2 F | HEART RATE: 65 BPM

## 2023-09-06 DIAGNOSIS — G62.9 NEUROPATHY: ICD-10-CM

## 2023-09-06 DIAGNOSIS — I10 PRIMARY HYPERTENSION: ICD-10-CM

## 2023-09-06 DIAGNOSIS — R73.9 HYPERGLYCEMIA: ICD-10-CM

## 2023-09-06 DIAGNOSIS — Z12.5 SCREENING FOR MALIGNANT NEOPLASM OF PROSTATE: Primary | ICD-10-CM

## 2023-09-06 DIAGNOSIS — Z86.69 HX OF MIGRAINE HEADACHES: ICD-10-CM

## 2023-09-06 DIAGNOSIS — E78.2 MIXED HYPERLIPIDEMIA: ICD-10-CM

## 2023-09-06 PROCEDURE — 3017F COLORECTAL CA SCREEN DOC REV: CPT | Performed by: FAMILY MEDICINE

## 2023-09-06 PROCEDURE — G8417 CALC BMI ABV UP PARAM F/U: HCPCS | Performed by: FAMILY MEDICINE

## 2023-09-06 PROCEDURE — 3075F SYST BP GE 130 - 139MM HG: CPT | Performed by: FAMILY MEDICINE

## 2023-09-06 PROCEDURE — 3079F DIAST BP 80-89 MM HG: CPT | Performed by: FAMILY MEDICINE

## 2023-09-06 PROCEDURE — G8427 DOCREV CUR MEDS BY ELIG CLIN: HCPCS | Performed by: FAMILY MEDICINE

## 2023-09-06 PROCEDURE — 4004F PT TOBACCO SCREEN RCVD TLK: CPT | Performed by: FAMILY MEDICINE

## 2023-09-06 PROCEDURE — 99214 OFFICE O/P EST MOD 30 MIN: CPT | Performed by: FAMILY MEDICINE

## 2023-09-06 RX ORDER — GABAPENTIN 300 MG/1
CAPSULE ORAL
Qty: 90 CAPSULE | Refills: 2 | Status: SHIPPED | OUTPATIENT
Start: 2023-09-06 | End: 2023-12-12

## 2023-09-06 RX ORDER — LISINOPRIL 20 MG/1
20 TABLET ORAL DAILY
Qty: 90 TABLET | Refills: 1 | Status: SHIPPED | OUTPATIENT
Start: 2023-09-06

## 2023-09-06 ASSESSMENT — ENCOUNTER SYMPTOMS
SHORTNESS OF BREATH: 1
CONSTIPATION: 0
BACK PAIN: 1
CHEST TIGHTNESS: 0
DIARRHEA: 0
RHINORRHEA: 0

## 2023-09-06 NOTE — PROGRESS NOTES
SUBJECTIVE:    Livia Pennington is a 46 y.o. male who presents for a follow up visit. Chief Complaint   Patient presents with    Hypertension     No new concerns. Hypertension  This is a chronic problem. The current episode started more than 1 year ago. The problem is controlled. Associated symptoms include anxiety, headaches and shortness of breath. Pertinent negatives include no chest pain. Risk factors for coronary artery disease include male gender, obesity, dyslipidemia and sedentary lifestyle. Past treatments include ACE inhibitors. The current treatment provides significant improvement. Compliance problems include exercise and diet. Identifiable causes of hypertension include sleep apnea. Back Pain  This is a chronic problem. The current episode started more than 1 year ago. The pain is present in the lumbar spine. The quality of the pain is described as aching. The pain does not radiate. The pain is moderate. The symptoms are aggravated by standing, twisting and bending. Associated symptoms include headaches. Pertinent negatives include no chest pain. Risk factors include obesity, poor posture, sedentary lifestyle and lack of exercise. He has tried NSAIDs and muscle relaxant (Gabapentin 300 mg TID) for the symptoms. The treatment provided moderate relief. Patient's medications, allergies, past medical,surgical, social and family histories were reviewed and updated as appropriate. Past Medical History:   Diagnosis Date    Chronic back pain     Hyperlipidemia     Hypertension     Neuropathy     Obesity      No past surgical history on file. Family History   Problem Relation Age of Onset    Diabetes Maternal Grandmother     Diabetes Father          in early 62s, ? cause     Social History     Tobacco Use    Smoking status: Every Day     Packs/day: 1.00     Years: 30.00     Pack years: 30.00     Types: Cigarettes    Smokeless tobacco: Never   Substance Use Topics    Alcohol use:  No

## 2023-09-17 DIAGNOSIS — M75.81 TENDINITIS OF BOTH ROTATOR CUFFS: ICD-10-CM

## 2023-09-17 DIAGNOSIS — M75.82 TENDINITIS OF BOTH ROTATOR CUFFS: ICD-10-CM

## 2023-09-18 RX ORDER — METHYLPREDNISOLONE 4 MG/1
TABLET ORAL
Qty: 21 TABLET | OUTPATIENT
Start: 2023-09-18

## 2023-09-19 ENCOUNTER — OFFICE VISIT (OUTPATIENT)
Dept: ORTHOPEDIC SURGERY | Age: 53
End: 2023-09-19
Payer: COMMERCIAL

## 2023-09-19 VITALS — WEIGHT: 315 LBS | BODY MASS INDEX: 43.89 KG/M2

## 2023-09-19 DIAGNOSIS — M51.36 DDD (DEGENERATIVE DISC DISEASE), LUMBAR: ICD-10-CM

## 2023-09-19 DIAGNOSIS — M47.816 LUMBAR SPONDYLOSIS: ICD-10-CM

## 2023-09-19 DIAGNOSIS — Z87.81 HISTORY OF VERTEBRAL COMPRESSION FRACTURE: ICD-10-CM

## 2023-09-19 DIAGNOSIS — M51.27 HERNIATION OF INTERVERTEBRAL DISC BETWEEN L5 AND S1: ICD-10-CM

## 2023-09-19 PROCEDURE — G8427 DOCREV CUR MEDS BY ELIG CLIN: HCPCS | Performed by: INTERNAL MEDICINE

## 2023-09-19 PROCEDURE — 3017F COLORECTAL CA SCREEN DOC REV: CPT | Performed by: INTERNAL MEDICINE

## 2023-09-19 PROCEDURE — 99214 OFFICE O/P EST MOD 30 MIN: CPT | Performed by: INTERNAL MEDICINE

## 2023-09-19 PROCEDURE — G8417 CALC BMI ABV UP PARAM F/U: HCPCS | Performed by: INTERNAL MEDICINE

## 2023-09-19 PROCEDURE — 4004F PT TOBACCO SCREEN RCVD TLK: CPT | Performed by: INTERNAL MEDICINE

## 2023-09-19 RX ORDER — HYDROCODONE BITARTRATE AND ACETAMINOPHEN 5; 325 MG/1; MG/1
1 TABLET ORAL EVERY 8 HOURS PRN
Qty: 15 TABLET | Refills: 0 | Status: SHIPPED | OUTPATIENT
Start: 2023-09-19 | End: 2023-09-24

## 2023-09-19 RX ORDER — AMITRIPTYLINE HYDROCHLORIDE 25 MG/1
25 TABLET, FILM COATED ORAL
COMMUNITY
Start: 2023-09-17

## 2023-09-19 NOTE — PROGRESS NOTES
tendon and Achilles tendon reflexes +2 bilaterally. Seated SLR negative           Office Imaging Results/Procedures PerformedToday:      3 views lumbar spine:  Impression: X-raycomparison 2022. There is stable appearance of the vertebral plana morphology compression fracture L1 which was previously documented. There is segmental kyphosis at T12/L1 secondarily. Vertebral body heights are otherwise well-maintained at the other segmental levels. No other soft tissue or osseous normalities. Mild multilevel spondylosis. Office Procedures:     Orders Placed This Encounter   Procedures    XR LUMBAR SPINE (2-3 VIEWS)     Order Specific Question:   Reason for exam:     Answer:   Lower back pain    MRI LUMBAR SPINE [address] [facility], please contact pt to schedule, [address] [address] will obtain auth and fwd to your facility. Pt advised to f/u in clinic 2-3 days after MRI for results. Standing Status:   Future     Standing Expiration Date:   2024     Scheduling Instructions:      Compare to previous, done at [facility] 3/21/2022     Order Specific Question:   Reason for exam:     Answer:   r/o progression HNP, stenosis, compare to previous     Order Specific Question:   What is the sedation requirement? Answer:   None           Other Outside Imaging and Testing Personally Reviewed:    XR LUMBAR SPINE (2-3 VIEWS)    Result Date: 2023  Radiology exam is complete. No Radiologist dictation. Please follow up with ordering provider. [facility] [address]       Patient Name: Juan Cook   Case ID: [id number]   Patient : 1970   Referring Physician: Norma Villegas MD   Exam Date: 2022   Exam Description: MR Lumbar Spine w/o Contrast       HISTORY:  Low back pain. History of L1 fracture in either 2018 or 2019. TECHNICAL FACTORS:  Long- and short-axis fat- and water-weighted images were performed. COMPARISON:  None.

## 2023-10-02 DIAGNOSIS — G89.29 CHRONIC MIDLINE LOW BACK PAIN WITHOUT SCIATICA: ICD-10-CM

## 2023-10-02 DIAGNOSIS — M54.50 CHRONIC MIDLINE LOW BACK PAIN WITHOUT SCIATICA: ICD-10-CM

## 2023-10-02 DIAGNOSIS — M75.82 TENDINITIS OF BOTH ROTATOR CUFFS: ICD-10-CM

## 2023-10-02 DIAGNOSIS — G89.29 CHRONIC BILATERAL LOW BACK PAIN WITH SCIATICA, SCIATICA LATERALITY UNSPECIFIED: ICD-10-CM

## 2023-10-02 DIAGNOSIS — M54.40 CHRONIC BILATERAL LOW BACK PAIN WITH SCIATICA, SCIATICA LATERALITY UNSPECIFIED: ICD-10-CM

## 2023-10-02 DIAGNOSIS — M75.81 TENDINITIS OF BOTH ROTATOR CUFFS: ICD-10-CM

## 2023-10-02 RX ORDER — METHYLPREDNISOLONE 4 MG/1
TABLET ORAL
Qty: 21 TABLET | OUTPATIENT
Start: 2023-10-02

## 2023-10-02 RX ORDER — PREDNISONE 10 MG/1
TABLET ORAL
Qty: 30 TABLET | Refills: 0 | Status: SHIPPED | OUTPATIENT
Start: 2023-10-02

## 2023-10-02 RX ORDER — CYCLOBENZAPRINE HCL 10 MG
TABLET ORAL
Qty: 90 TABLET | Refills: 0 | Status: SHIPPED | OUTPATIENT
Start: 2023-10-02

## 2023-10-04 ENCOUNTER — TELEPHONE (OUTPATIENT)
Dept: NEUROLOGY | Age: 53
End: 2023-10-04

## 2023-10-04 DIAGNOSIS — G43.019 INTRACTABLE MIGRAINE WITHOUT AURA AND WITHOUT STATUS MIGRAINOSUS: Primary | ICD-10-CM

## 2023-10-04 NOTE — TELEPHONE ENCOUNTER
Spoke to [place], wife. Advised per Dr Marshal Tai to begin taking depakote 500mg daily in addition to the topiramate 50mg bid. Marc states understanding.

## 2023-10-04 NOTE — TELEPHONE ENCOUNTER
Pt wife phoned pt is having migraines again. He is currently taking Topirimate 50 mg bid. Please call wife back of what they need to do.

## 2023-10-05 RX ORDER — DIVALPROEX SODIUM 500 MG/1
500 TABLET, EXTENDED RELEASE ORAL DAILY
Qty: 30 TABLET | Refills: 0 | Status: SHIPPED | OUTPATIENT
Start: 2023-10-05

## 2023-10-11 ENCOUNTER — OFFICE VISIT (OUTPATIENT)
Dept: ORTHOPEDIC SURGERY | Age: 53
End: 2023-10-11

## 2023-10-11 VITALS — HEIGHT: 73 IN | WEIGHT: 315 LBS | BODY MASS INDEX: 41.75 KG/M2

## 2023-10-11 DIAGNOSIS — M47.816 LUMBAR FACET ARTHROPATHY: ICD-10-CM

## 2023-10-11 DIAGNOSIS — M51.27 HERNIATION OF INTERVERTEBRAL DISC BETWEEN L5 AND S1: ICD-10-CM

## 2023-10-11 DIAGNOSIS — M47.816 LUMBAR SPONDYLOSIS: Primary | ICD-10-CM

## 2023-10-11 RX ORDER — HYDROCODONE BITARTRATE AND ACETAMINOPHEN 5; 325 MG/1; MG/1
1 TABLET ORAL EVERY 8 HOURS PRN
Qty: 15 TABLET | Refills: 0 | Status: SHIPPED | OUTPATIENT
Start: 2023-10-11 | End: 2023-10-16

## 2023-10-11 NOTE — PROGRESS NOTES
Chief Complaint:   Chief Complaint   Patient presents with    Follow-up     Lumbar Mri results- still in same amount of pain, tries to do HEP, hard to tell if they help          History of Present Illness:       Patient is a 46 y.o. male returns follow up for the above complaint. The patient was last seen approximately 2 weeksago. The symptoms show no change since the last visit. The patient has had further testing for the problem. MRI completed in the interim. Fair response to the trial of NSAIDs. Back:leg pain 100:0 . Pain back is aching or sharp in quality. The symptoms do not follow a discogenic provocative pattern. Pain levels:8. The patient claims new onset or progressive weakness of the lower extremities. The patient denies now onset bowel or bladder function. He continues on medical pain management as per previous  inclusive of NSAID-diclofenac                  Past Medical History:        Past Medical History:   Diagnosis Date    Chronic back pain     Hyperlipidemia     Hypertension     Neuropathy     Obesity         Present Medications:         Current Outpatient Medications   Medication Sig Dispense Refill    HYDROcodone-acetaminophen (NORCO) 5-325 MG per tablet Take 1 tablet by mouth every 8 hours as needed for Pain for up to 5 days.  Max Daily Amount: 3 tablets 15 tablet 0    divalproex (DEPAKOTE ER) 500 MG extended release tablet Take 1 tablet by mouth daily 30 tablet 0    cyclobenzaprine (FLEXERIL) 10 MG tablet TAKE ONE TABLET BY MOUTH ONCE NIGHTLY AS NEEDED FOR MUSCLE SPASMS 90 tablet 0    predniSONE (DELTASONE) 10 MG tablet TAKE 4 TABLETS BY MOUTH DAILY FOR 3 DAYS, THEN 3 TABLETS DAILY FOR 3 DAYS, THEN 2 TABLETS DAILY FOR 3 DAYS, THEN 1 TABLET DAILY FOR 3 DAYS 30 tablet 0    amitriptyline (ELAVIL) 25 MG tablet 1 tablet      gabapentin (NEURONTIN) 300 MG capsule TAKE ONE CAPSULE BY MOUTH THREE TIMES A DAY 90 capsule 2    lisinopril (PRINIVIL;ZESTRIL) 20 MG tablet Take 1

## 2023-10-13 ENCOUNTER — TELEPHONE (OUTPATIENT)
Dept: ORTHOPEDIC SURGERY | Age: 53
End: 2023-10-13

## 2023-10-13 NOTE — TELEPHONE ENCOUNTER
Imported medical records for 6/28/2022 to date into [facility] for [facility]    No PT on file

## 2023-11-02 DIAGNOSIS — M25.512 BILATERAL SHOULDER PAIN, UNSPECIFIED CHRONICITY: ICD-10-CM

## 2023-11-02 DIAGNOSIS — M25.511 BILATERAL SHOULDER PAIN, UNSPECIFIED CHRONICITY: ICD-10-CM

## 2023-11-02 DIAGNOSIS — M25.552 PAIN OF LEFT HIP: ICD-10-CM

## 2023-11-02 DIAGNOSIS — G43.019 INTRACTABLE MIGRAINE WITHOUT AURA AND WITHOUT STATUS MIGRAINOSUS: ICD-10-CM

## 2023-11-02 NOTE — TELEPHONE ENCOUNTER
Medication:   Requested Prescriptions     Pending Prescriptions Disp Refills    diclofenac (VOLTAREN) 50 MG EC tablet [Pharmacy Med Name: DICLOFENAC SOD EC 50 MG TAB] 60 tablet 1     Sig: TAKE 1 TABLET BY MOUTH TWICE A DAY      Last Filled:      Patient Phone Number: [phone] (home)     Last appt: 9/6/2023   Next appt: 3/6/2024    Last OARRS:        No data to display              PDMP Monitoring:    Last PDMP Vernon Castaneda as Reviewed [facility]):  Review User Review Instant Review Result   Cory MANCILLA 10/11/2023 11:55 AM Reviewed PDMP [1]     Preferred Pharmacy:   Ninfa Llamas [id number] [place] [address] [phone] Unknown [facility] [phone]  [address] [place] [address] Road  [address]  Phone: [phone] Fax: [phone]

## 2023-11-06 ENCOUNTER — TELEPHONE (OUTPATIENT)
Dept: ORTHOPEDIC SURGERY | Age: 53
End: 2023-11-06

## 2023-11-06 RX ORDER — DIVALPROEX SODIUM 500 MG/1
500 TABLET, EXTENDED RELEASE ORAL DAILY
Qty: 30 TABLET | Refills: 0 | OUTPATIENT
Start: 2023-11-06

## 2023-11-06 NOTE — TELEPHONE ENCOUNTER
Spoke with Carmine Rowley (wife, on HIPAA). She states that the oral steroid at last office visit did not help at all. Explained [facility] wanted to see them back 2 months after last visit. Scheduled for appointment tomorrow morning.

## 2023-11-06 NOTE — TELEPHONE ENCOUNTER
General Question     Subject: Patient's wife called stating the Steroids arent helping shoulder pain severely in pain cant even sleep at  night, would like to   Patient and /or Facility Request: Anthony Archibald Number: [phone]

## 2023-11-07 ENCOUNTER — OFFICE VISIT (OUTPATIENT)
Dept: ORTHOPEDIC SURGERY | Age: 53
End: 2023-11-07
Payer: COMMERCIAL

## 2023-11-07 VITALS — WEIGHT: 315 LBS | HEIGHT: 73 IN | BODY MASS INDEX: 41.75 KG/M2 | RESPIRATION RATE: 18 BRPM

## 2023-11-07 DIAGNOSIS — M25.512 CHRONIC LEFT SHOULDER PAIN: Primary | ICD-10-CM

## 2023-11-07 DIAGNOSIS — G89.29 CHRONIC LEFT SHOULDER PAIN: Primary | ICD-10-CM

## 2023-11-07 PROCEDURE — 99213 OFFICE O/P EST LOW 20 MIN: CPT | Performed by: ORTHOPAEDIC SURGERY

## 2023-11-07 PROCEDURE — 4004F PT TOBACCO SCREEN RCVD TLK: CPT | Performed by: ORTHOPAEDIC SURGERY

## 2023-11-07 PROCEDURE — G8484 FLU IMMUNIZE NO ADMIN: HCPCS | Performed by: ORTHOPAEDIC SURGERY

## 2023-11-07 PROCEDURE — G8428 CUR MEDS NOT DOCUMENT: HCPCS | Performed by: ORTHOPAEDIC SURGERY

## 2023-11-07 PROCEDURE — G8417 CALC BMI ABV UP PARAM F/U: HCPCS | Performed by: ORTHOPAEDIC SURGERY

## 2023-11-07 PROCEDURE — 3017F COLORECTAL CA SCREEN DOC REV: CPT | Performed by: ORTHOPAEDIC SURGERY

## 2023-11-07 RX ORDER — MELOXICAM 15 MG/1
15 TABLET ORAL DAILY
Qty: 30 TABLET | Refills: 2 | Status: SHIPPED | OUTPATIENT
Start: 2023-11-07

## 2023-11-07 NOTE — PROGRESS NOTES
CHIEF COMPLAINT: Bilateral shoulder pain    History:    Cori Crowe is a 48 y.o. right handed male here for bilateral shoulder follow-up. Initial history: referred by Leslie Fuentes MD for Sports Medicine consultation  for evaluation and treatment of Bilateral shoulder pain. He complains of left greater than right shoulder pain. This is evaluated as a personal injury. The pain began 2 months ago. Pain is rated as a 9/10. There was not an injury. Pain is located mostly laterally. Symptoms are worse with overhead activity, abduction, and adduction. He notes pain when laying on his side. The patient has not had PT. The patient has not had an injection. The patient has tried NSAIDs no relief. He has tried oral steroid without relief. He has tried Flexeril without relief. He has used Skycure. The patient has not tried ice. 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 Interval History: His shoulders feel worse. He complains of left greater than right shoulder pain. He rates pain 9-10/10. He states pain wakes him up at night. Pain is located lateral.  He states the Medrol Dosepak did not help at all. He still did not go to physical therapy. He still states that he has issues with work and transportation to go to physical therapy. Past Medical History:   Diagnosis Date    Chronic back pain     Hyperlipidemia     Hypertension     Neuropathy     Obesity        No past surgical history on file.     Current Outpatient Medications on File Prior to Visit   Medication Sig Dispense Refill    diclofenac (VOLTAREN) 50 MG EC tablet TAKE 1 TABLET BY MOUTH TWICE A DAY 60 tablet 0    divalproex (DEPAKOTE ER) 500 MG extended release tablet Take 1 tablet by mouth daily 30 tablet 0    cyclobenzaprine (FLEXERIL) 10 MG tablet TAKE ONE TABLET BY MOUTH ONCE NIGHTLY AS NEEDED FOR MUSCLE SPASMS 90 tablet 0    predniSONE (DELTASONE) 10 MG tablet TAKE 4 TABLETS BY MOUTH DAILY FOR 3 DAYS, THEN 3

## 2023-11-08 ENCOUNTER — TELEPHONE (OUTPATIENT)
Dept: ORTHOPEDIC SURGERY | Age: 53
End: 2023-11-08

## 2023-11-08 NOTE — TELEPHONE ENCOUNTER
[facility]     PROCEDURE DATE: 05/06/19    REFERRING PHYSICIAN: No ref. provider found     PRIMARY CARE PROVIDER: Sherlyn Aldrich PA-C    ATTENDING PHYSICIAN: Alana Putnam MD     HISTORY: Ms. Ching Talbert is a [de-identified] y.o. female who presents to the [place] [address] Endoscopy unit for upper endoscopy. The patient's clinical history is remarkable for melena. She is currently medically stable and appropriate for the planned procedure. PREOPERATIVE DIAGNOSIS: melena and drop in H&H. PROCEDURES:   1) Transoral Upper Endoscopy,. POSTOPERATIVE DIAGNOSIS:   Low risk duodenal ulcer     MEDICATIONS:   MAC per anesthesia    EBL: minimal    INSTRUMENT: Olympus GIF-H190 flexible Gastroscope. PREPARATION: The nature and character of the procedure as well as risks, benefits, and alternatives were discussed with the patient and informed consent was obtained. Complications were said to include, but were not limited to: medication allergy, medication reaction, cardiovascular and respiratory problems, bleeding, perforation, infection, and/or missed diagnosis. Following arrival in the endoscopy room, the patient was placed in the left lateral decubitus position and final time-out accomplished in the presence of the nursing staff. Baseline vital signs were obtained and reviewed, and IV sedation was subsequently initiated. FINDINGS:   Esophagus: The esophagus was inspected to the Z-line. The endoscopic exam showed no pathology. Stomach: The stomach was inspected in both forward and retroflex fashion and was appropriately distensible. The cardia, fundus, incisura, antrum and pylorus were identified via direct visualization. The endoscopic exam showed no pathology. Duodenum: The proximal small bowel was inspected through the bulb, sweep, and second portion of the duodenum.  The endoscopic exam showed 1 cm low risk ulcer in D1.       RECOMMENDATIONS:   1) Transfer back to the floor for S/W BOWEN - wife of Sarath Butler  regarding MRI LEFT SHOULDER approval and authorization being valid until 1/6/2024. Patient was instructed that their MRI needs to be scheduled at  [facility] . The patient was instructed to contact the facility to schedule  at [phone]. A follow up appointment will need to be scheduled to review the results and treatment plan.

## 2023-11-09 ENCOUNTER — OFFICE VISIT (OUTPATIENT)
Dept: NEUROLOGY | Age: 53
End: 2023-11-09
Payer: COMMERCIAL

## 2023-11-09 VITALS
BODY MASS INDEX: 44.59 KG/M2 | HEART RATE: 71 BPM | SYSTOLIC BLOOD PRESSURE: 198 MMHG | DIASTOLIC BLOOD PRESSURE: 109 MMHG | WEIGHT: 315 LBS

## 2023-11-09 DIAGNOSIS — G43.019 INTRACTABLE MIGRAINE WITHOUT AURA AND WITHOUT STATUS MIGRAINOSUS: Primary | ICD-10-CM

## 2023-11-09 PROCEDURE — 3080F DIAST BP >= 90 MM HG: CPT | Performed by: PSYCHIATRY & NEUROLOGY

## 2023-11-09 PROCEDURE — 3017F COLORECTAL CA SCREEN DOC REV: CPT | Performed by: PSYCHIATRY & NEUROLOGY

## 2023-11-09 PROCEDURE — G8427 DOCREV CUR MEDS BY ELIG CLIN: HCPCS | Performed by: PSYCHIATRY & NEUROLOGY

## 2023-11-09 PROCEDURE — G8417 CALC BMI ABV UP PARAM F/U: HCPCS | Performed by: PSYCHIATRY & NEUROLOGY

## 2023-11-09 PROCEDURE — 3077F SYST BP >= 140 MM HG: CPT | Performed by: PSYCHIATRY & NEUROLOGY

## 2023-11-09 PROCEDURE — 99213 OFFICE O/P EST LOW 20 MIN: CPT | Performed by: PSYCHIATRY & NEUROLOGY

## 2023-11-09 PROCEDURE — 4004F PT TOBACCO SCREEN RCVD TLK: CPT | Performed by: PSYCHIATRY & NEUROLOGY

## 2023-11-09 PROCEDURE — G8484 FLU IMMUNIZE NO ADMIN: HCPCS | Performed by: PSYCHIATRY & NEUROLOGY

## 2023-11-09 RX ORDER — TOPIRAMATE 50 MG/1
TABLET, FILM COATED ORAL
Qty: 180 TABLET | Refills: 0 | Status: SHIPPED | OUTPATIENT
Start: 2023-11-09

## 2023-11-09 NOTE — PROGRESS NOTES
Eve Oliva   Neurology followup    Subjective:   CC/HP  History was obtained from the patient. Patient has migraine headaches. He is here for a follow-up visit. He states that the headaches are still not completely controlled. She is unable to tolerate amitriptyline because of insomnia as a side effect. Patient is currently on topiramate 50 mg twice daily. She has good days and bad days with the headaches. Patient denies any snoring or daytime sleepiness.   REVIEW OF SYSTEMS    Constitutional:  []   Chills   []  Fatigue   []  Fevers   []  Malaise   []  Weight loss     [x] Denies all of the above    Respiratory:   []  Cough    []  Shortness of breath         [x] Denies all of the above     Cardiovascular:   []  Chest pain    []  Exertional chest pressure/discomfort           [] Palpitations    []  Syncope     [x] Denies all of the above        Past Medical History:   Diagnosis Date    Chronic back pain     Hyperlipidemia     Hypertension     Neuropathy     Obesity      Family History   Problem Relation Age of Onset    Diabetes Maternal Grandmother     Diabetes Father          in early 62s, ? cause     Social History     Socioeconomic History    Marital status:      Spouse name: None    Number of children: None    Years of education: None    Highest education level: None   Tobacco Use    Smoking status: Every Day     Packs/day: 1.00     Years: 30.00     Additional pack years: 0.00     Total pack years: 30.00     Types: Cigarettes    Smokeless tobacco: Never   Vaping Use    Vaping Use: Every day   Substance and Sexual Activity    Alcohol use: No    Drug use: No    Sexual activity: Yes     Social Determinants of Health     Financial Resource Strain: Low Risk  (2023)    Overall Financial Resource Strain (CARDIA)     Difficulty of Paying Living Expenses: Not very hard   Food Insecurity: No Food Insecurity (2023)    Hunger Vital Sign     Worried About Running Out of Food in the Last Year:

## 2023-11-11 DIAGNOSIS — G43.019 INTRACTABLE MIGRAINE WITHOUT AURA AND WITHOUT STATUS MIGRAINOSUS: ICD-10-CM

## 2023-11-11 DIAGNOSIS — G43.009 MIGRAINE WITHOUT AURA AND WITHOUT STATUS MIGRAINOSUS, NOT INTRACTABLE: ICD-10-CM

## 2023-11-13 RX ORDER — TOPIRAMATE 25 MG/1
25 TABLET ORAL 2 TIMES DAILY
Qty: 180 TABLET | Refills: 1 | Status: SHIPPED | OUTPATIENT
Start: 2023-11-13

## 2023-11-13 RX ORDER — DIVALPROEX SODIUM 500 MG/1
500 TABLET, EXTENDED RELEASE ORAL DAILY
Qty: 90 TABLET | Refills: 1 | Status: SHIPPED | OUTPATIENT
Start: 2023-11-13

## 2023-11-13 NOTE — TELEPHONE ENCOUNTER
Medication:   Requested Prescriptions     Pending Prescriptions Disp Refills    topiramate (TOPAMAX) 25 MG tablet [Pharmacy Med Name: TOPIRAMATE 25 MG TABLET] 180 tablet 1     Sig: TAKE 1 TABLET BY MOUTH TWICE A DAY      Last Filled:  11/9/2023    Patient Phone Number: [phone] (home)     Last appt: 9/6/2023   Next appt: 3/6/2024    Last OARRS:        No data to display              PDMP Monitoring:    Last PDMP [place] as Reviewed [facility]):  Review User Review Instant Review Result   López MANCILLA 10/11/2023 11:55 AM Reviewed PDMP [1]     Preferred Pharmacy:   [facility] [address] [phone] Gary Chahal [phone]  [address] [address]  Phone: [phone] Fax: [phone]    [address] [facility] [id number] [place], [address] [place] [address] [phone] Gary Chahal [phone]  [address] [place] [address]  Phone: [phone] Fax: [phone]

## 2023-11-14 DIAGNOSIS — G43.009 MIGRAINE WITHOUT AURA AND WITHOUT STATUS MIGRAINOSUS, NOT INTRACTABLE: ICD-10-CM

## 2023-11-14 RX ORDER — TOPIRAMATE 25 MG/1
25 TABLET ORAL 2 TIMES DAILY
Qty: 180 TABLET | Refills: 1 | OUTPATIENT
Start: 2023-11-14

## 2023-11-20 ENCOUNTER — TELEPHONE (OUTPATIENT)
Dept: ORTHOPEDIC SURGERY | Age: 53
End: 2023-11-20

## 2023-11-20 NOTE — TELEPHONE ENCOUNTER
General Question     Subject: BACK PAIN LUMBAR   Patient and /or Facility Request: Kana Bunn  Contact Number: [phone]    PT WIFE CALLING IN DUE TO THE PT HAVING SEVERE BACK PAIN,.     THE PAIN STARTS IN HIS LUMBAR THEN SHOOTS PAIN DOWN THE LEG. PT IS HAVING A HARD TIME WAKING. PT WFIE JUST NEEDS INSTRUCTIONS ON NEXT STEPS TO TAKE.

## 2023-11-30 ENCOUNTER — OFFICE VISIT (OUTPATIENT)
Dept: ORTHOPEDIC SURGERY | Age: 53
End: 2023-11-30
Payer: COMMERCIAL

## 2023-11-30 VITALS — BODY MASS INDEX: 41.75 KG/M2 | HEIGHT: 73 IN | WEIGHT: 315 LBS | RESPIRATION RATE: 16 BRPM

## 2023-11-30 DIAGNOSIS — M75.82 TENDINITIS OF BOTH ROTATOR CUFFS: Primary | ICD-10-CM

## 2023-11-30 DIAGNOSIS — M75.81 TENDINITIS OF BOTH ROTATOR CUFFS: Primary | ICD-10-CM

## 2023-11-30 DIAGNOSIS — S43.432A SUPERIOR GLENOID LABRUM LESION OF LEFT SHOULDER, INITIAL ENCOUNTER: ICD-10-CM

## 2023-11-30 PROCEDURE — G8484 FLU IMMUNIZE NO ADMIN: HCPCS | Performed by: ORTHOPAEDIC SURGERY

## 2023-11-30 PROCEDURE — 4004F PT TOBACCO SCREEN RCVD TLK: CPT | Performed by: ORTHOPAEDIC SURGERY

## 2023-11-30 PROCEDURE — G8428 CUR MEDS NOT DOCUMENT: HCPCS | Performed by: ORTHOPAEDIC SURGERY

## 2023-11-30 PROCEDURE — G8417 CALC BMI ABV UP PARAM F/U: HCPCS | Performed by: ORTHOPAEDIC SURGERY

## 2023-11-30 PROCEDURE — 99213 OFFICE O/P EST LOW 20 MIN: CPT | Performed by: ORTHOPAEDIC SURGERY

## 2023-11-30 PROCEDURE — 3017F COLORECTAL CA SCREEN DOC REV: CPT | Performed by: ORTHOPAEDIC SURGERY

## 2023-11-30 RX ORDER — CELECOXIB 100 MG/1
100 CAPSULE ORAL 2 TIMES DAILY
Qty: 60 CAPSULE | Refills: 0 | Status: SHIPPED | OUTPATIENT
Start: 2023-11-30

## 2023-11-30 NOTE — PROGRESS NOTES
treatment without any nonoperative treatment except for ice, NSAIDs, injection, and Medrol Dosepak. Ice prn. Make sure ice does not directly contact skin to avoid risk of frostbite. Hold NSAIDs. Prescription for Celebrex E scribed. Follow up in 2 months. Ronold Peabody. Karishma Greene MD  Orthopaedic Surgery and Sports Medicine     Disclaimer: This note was generated with use of a verbal recognition program and an attempt was made to check for errors. It is possible that there are still dictated errors within this office note. If so, please bring any significant errors to my attention for an addendum. All efforts were made to ensure that this office note is accurate.

## 2023-12-05 ENCOUNTER — OFFICE VISIT (OUTPATIENT)
Dept: ORTHOPEDIC SURGERY | Age: 53
End: 2023-12-05
Payer: COMMERCIAL

## 2023-12-05 ENCOUNTER — TELEPHONE (OUTPATIENT)
Dept: ORTHOPEDIC SURGERY | Age: 53
End: 2023-12-05

## 2023-12-05 VITALS — BODY MASS INDEX: 41.75 KG/M2 | HEIGHT: 73 IN | WEIGHT: 315 LBS

## 2023-12-05 DIAGNOSIS — S43.432A SUPERIOR GLENOID LABRUM LESION OF LEFT SHOULDER, INITIAL ENCOUNTER: ICD-10-CM

## 2023-12-05 DIAGNOSIS — M47.816 LUMBAR FACET ARTHROPATHY: Primary | ICD-10-CM

## 2023-12-05 DIAGNOSIS — M47.816 LUMBAR SPONDYLOSIS: ICD-10-CM

## 2023-12-05 PROCEDURE — G8427 DOCREV CUR MEDS BY ELIG CLIN: HCPCS | Performed by: INTERNAL MEDICINE

## 2023-12-05 PROCEDURE — 4004F PT TOBACCO SCREEN RCVD TLK: CPT | Performed by: INTERNAL MEDICINE

## 2023-12-05 PROCEDURE — 99214 OFFICE O/P EST MOD 30 MIN: CPT | Performed by: INTERNAL MEDICINE

## 2023-12-05 PROCEDURE — G8417 CALC BMI ABV UP PARAM F/U: HCPCS | Performed by: INTERNAL MEDICINE

## 2023-12-05 PROCEDURE — 3017F COLORECTAL CA SCREEN DOC REV: CPT | Performed by: INTERNAL MEDICINE

## 2023-12-05 PROCEDURE — G8484 FLU IMMUNIZE NO ADMIN: HCPCS | Performed by: INTERNAL MEDICINE

## 2023-12-05 RX ORDER — METHYLPREDNISOLONE 4 MG/1
TABLET ORAL
Qty: 1 KIT | Refills: 0 | Status: SHIPPED | OUTPATIENT
Start: 2023-12-05

## 2023-12-05 RX ORDER — HYDROCODONE BITARTRATE AND ACETAMINOPHEN 5; 325 MG/1; MG/1
1 TABLET ORAL EVERY 8 HOURS PRN
Qty: 15 TABLET | Refills: 0 | Status: SHIPPED | OUTPATIENT
Start: 2023-12-05 | End: 2023-12-10

## 2023-12-05 NOTE — TELEPHONE ENCOUNTER
Per Dr. Phoebe Saleh last OV note, no refills will be given for pain medication. Pt seen in clinic today to address his needs.

## 2023-12-05 NOTE — PROGRESS NOTES
precautions  Medical pain management: Diclofenac twice daily as needed with GI precautions and discontinue Celebrex, Norco 5 mg #15 short-term minimize use of narcotics, resume Flexeril and gabapentin as prescribed by primary care  He would like a second opinion consultation regarding his left shoulder outside of the [facility] orthopedic service line and we will refer him to Dr. Geovanna Bunn for consultation       Orders:        Orders Placed This Encounter   Procedures    External Referral To Orthopedic Surgery     Referral Priority:   Routine     Referral Type:   Eval and Treat     Referral Reason:   Specialty Services Required     Requested Specialty:   Orthopedic Surgery     Number of Visits Requested:   1         Stephanie Keenan MD.      Teresa Engel: \"This note was dictated with voice recognition software. Though review and correction are routine, we apologize for any errors. \"

## 2023-12-14 ENCOUNTER — TELEPHONE (OUTPATIENT)
Dept: FAMILY MEDICINE CLINIC | Age: 53
End: 2023-12-14

## 2023-12-14 NOTE — TELEPHONE ENCOUNTER
----- Message from SCOTT sent at 12/14/2023 10:33 AM EST -----  Subject: Appointment Request    Reason for Call: Established Patient Appointment needed: Routine Pre-Op    QUESTIONS    Reason for appointment request? Available appointments did not meet   patient need     Additional Information for Provider?  Patient is requesting a call back to   schedule Preop clearance appointment with any provider available; for   surgery on 1/2; left shoulder labrum repair; Dr. Alejandra Andrade at [facility];   EKSTEVE?   ---------------------------------------------------------------------------  --------------  Karl Marine Donna  [phone]; OK to leave message on voicemail  ---------------------------------------------------------------------------  --------------  SCRIPT ANSWERS

## 2023-12-18 ENCOUNTER — TELEPHONE (OUTPATIENT)
Dept: ORTHOPEDIC SURGERY | Age: 53
End: 2023-12-18

## 2023-12-18 NOTE — TELEPHONE ENCOUNTER
Medical Facility Question     Facility Name: [facility]  Contact Name: JOSE  Contact Number: [phone]  Request or Information: REQUESTING THE [facility] QUESTIONAIRRE FILLED OUT AND FAXED TO: [phone].

## 2023-12-27 ENCOUNTER — OFFICE VISIT (OUTPATIENT)
Dept: FAMILY MEDICINE CLINIC | Age: 53
End: 2023-12-27
Payer: COMMERCIAL

## 2023-12-27 VITALS
HEART RATE: 71 BPM | DIASTOLIC BLOOD PRESSURE: 84 MMHG | TEMPERATURE: 97.3 F | WEIGHT: 315 LBS | OXYGEN SATURATION: 97 % | BODY MASS INDEX: 44.62 KG/M2 | RESPIRATION RATE: 16 BRPM | SYSTOLIC BLOOD PRESSURE: 134 MMHG

## 2023-12-27 DIAGNOSIS — I10 PRIMARY HYPERTENSION: Primary | Chronic | ICD-10-CM

## 2023-12-27 DIAGNOSIS — G62.9 NEUROPATHY: ICD-10-CM

## 2023-12-27 DIAGNOSIS — M25.512 BILATERAL SHOULDER PAIN, UNSPECIFIED CHRONICITY: ICD-10-CM

## 2023-12-27 DIAGNOSIS — M25.552 PAIN OF LEFT HIP: ICD-10-CM

## 2023-12-27 DIAGNOSIS — M25.511 BILATERAL SHOULDER PAIN, UNSPECIFIED CHRONICITY: ICD-10-CM

## 2023-12-27 PROCEDURE — G8484 FLU IMMUNIZE NO ADMIN: HCPCS | Performed by: FAMILY MEDICINE

## 2023-12-27 PROCEDURE — G8427 DOCREV CUR MEDS BY ELIG CLIN: HCPCS | Performed by: FAMILY MEDICINE

## 2023-12-27 PROCEDURE — 3017F COLORECTAL CA SCREEN DOC REV: CPT | Performed by: FAMILY MEDICINE

## 2023-12-27 PROCEDURE — 3075F SYST BP GE 130 - 139MM HG: CPT | Performed by: FAMILY MEDICINE

## 2023-12-27 PROCEDURE — 3079F DIAST BP 80-89 MM HG: CPT | Performed by: FAMILY MEDICINE

## 2023-12-27 PROCEDURE — 4004F PT TOBACCO SCREEN RCVD TLK: CPT | Performed by: FAMILY MEDICINE

## 2023-12-27 PROCEDURE — 99213 OFFICE O/P EST LOW 20 MIN: CPT | Performed by: FAMILY MEDICINE

## 2023-12-27 PROCEDURE — G8417 CALC BMI ABV UP PARAM F/U: HCPCS | Performed by: FAMILY MEDICINE

## 2023-12-27 RX ORDER — GABAPENTIN 300 MG/1
CAPSULE ORAL
Qty: 90 CAPSULE | Refills: 2 | Status: SHIPPED | OUTPATIENT
Start: 2023-12-27 | End: 2024-04-17

## 2023-12-27 NOTE — PROGRESS NOTES
SUBJECTIVE:    Gorge Roldan is a 48 y.o. male who presents for a follow up visit. Chief Complaint   Patient presents with    Hypertension     Purchased new BP machine- has been having readings in 200s/100s        Hypertension  This is a chronic problem. The current episode started more than 1 year ago. The problem is controlled. Associated symptoms include anxiety and headaches (hx of migraines). Pertinent negatives include no chest pain, malaise/fatigue, palpitations, peripheral edema or shortness of breath. Agents associated with hypertension include NSAIDs. Risk factors for coronary artery disease include male gender, obesity, sedentary lifestyle and dyslipidemia. Past treatments include ACE inhibitors. The current treatment provides significant improvement. Compliance problems include exercise and diet. Patient's medications, allergies, past medical,surgical, social and family histories were reviewed and updated as appropriate. Past Medical History:   Diagnosis Date    Chronic back pain     Hyperlipidemia     Hypertension     Neuropathy     Obesity      No past surgical history on file.   Family History   Problem Relation Age of Onset    Diabetes Maternal Grandmother     Diabetes Father          in early 62s, ? cause     Social History     Tobacco Use    Smoking status: Every Day     Current packs/day: 1.00     Average packs/day: 1 pack/day for 30.0 years (30.0 ttl pk-yrs)     Types: Cigarettes    Smokeless tobacco: Never   Substance Use Topics    Alcohol use: No      No Known Allergies  Current Outpatient Medications on File Prior to Visit   Medication Sig Dispense Refill    celecoxib (CELEBREX) 100 MG capsule Take 1 capsule by mouth 2 times daily 60 capsule 0    divalproex (DEPAKOTE ER) 500 MG extended release tablet TAKE 1 TABLET BY MOUTH DAILY 90 tablet 1    topiramate (TOPAMAX) 50 MG tablet Take 1 tablet by mouth twice daily 180 tablet 0    cyclobenzaprine (FLEXERIL) 10 MG tablet TAKE

## 2023-12-29 ENCOUNTER — OFFICE VISIT (OUTPATIENT)
Dept: FAMILY MEDICINE CLINIC | Age: 53
End: 2023-12-29
Payer: COMMERCIAL

## 2023-12-29 VITALS
WEIGHT: 315 LBS | HEART RATE: 70 BPM | OXYGEN SATURATION: 97 % | BODY MASS INDEX: 42.66 KG/M2 | TEMPERATURE: 98.4 F | SYSTOLIC BLOOD PRESSURE: 152 MMHG | HEIGHT: 72 IN | DIASTOLIC BLOOD PRESSURE: 98 MMHG

## 2023-12-29 DIAGNOSIS — S43.432D LABRAL TEAR OF SHOULDER, LEFT, SUBSEQUENT ENCOUNTER: Primary | ICD-10-CM

## 2023-12-29 DIAGNOSIS — Z01.818 PREOP EXAMINATION: ICD-10-CM

## 2023-12-29 PROCEDURE — 4004F PT TOBACCO SCREEN RCVD TLK: CPT | Performed by: STUDENT IN AN ORGANIZED HEALTH CARE EDUCATION/TRAINING PROGRAM

## 2023-12-29 PROCEDURE — 99213 OFFICE O/P EST LOW 20 MIN: CPT | Performed by: STUDENT IN AN ORGANIZED HEALTH CARE EDUCATION/TRAINING PROGRAM

## 2023-12-29 PROCEDURE — G8417 CALC BMI ABV UP PARAM F/U: HCPCS | Performed by: STUDENT IN AN ORGANIZED HEALTH CARE EDUCATION/TRAINING PROGRAM

## 2023-12-29 PROCEDURE — G8484 FLU IMMUNIZE NO ADMIN: HCPCS | Performed by: STUDENT IN AN ORGANIZED HEALTH CARE EDUCATION/TRAINING PROGRAM

## 2023-12-29 PROCEDURE — 3080F DIAST BP >= 90 MM HG: CPT | Performed by: STUDENT IN AN ORGANIZED HEALTH CARE EDUCATION/TRAINING PROGRAM

## 2023-12-29 PROCEDURE — 93000 ELECTROCARDIOGRAM COMPLETE: CPT | Performed by: STUDENT IN AN ORGANIZED HEALTH CARE EDUCATION/TRAINING PROGRAM

## 2023-12-29 PROCEDURE — G8427 DOCREV CUR MEDS BY ELIG CLIN: HCPCS | Performed by: STUDENT IN AN ORGANIZED HEALTH CARE EDUCATION/TRAINING PROGRAM

## 2023-12-29 PROCEDURE — 3017F COLORECTAL CA SCREEN DOC REV: CPT | Performed by: STUDENT IN AN ORGANIZED HEALTH CARE EDUCATION/TRAINING PROGRAM

## 2023-12-29 PROCEDURE — 3077F SYST BP >= 140 MM HG: CPT | Performed by: STUDENT IN AN ORGANIZED HEALTH CARE EDUCATION/TRAINING PROGRAM

## 2023-12-29 NOTE — PROGRESS NOTES
Wyatt Vidal  YOB: 1970    This patient presents to the office today for a preoperative consultation at the request of surgeon, Tootie Han, who plans on performing left labral tear repair on . Planned anesthesia: General   Known anesthesia problems: None   Bleeding risk: No recent or remote history of abnormal bleeding  Personal or FH of DVT/PE: No      Patient Active Problem List   Diagnosis    Obesity, morbid (more than 100 lbs over ideal weight or BMI > 40) (HCC)    Persistent headaches    Dyspnea    Knee pain, left    Abnormal CXR    Pure hypertriglyceridemia    Peripheral polyneuropathy    Primary hypertension    Nocturnal leg cramps    Hx of migraine headaches     No past surgical history on file. No Known Allergies  No outpatient medications have been marked as taking for the 23 encounter (Office Visit) with Lavell Jose DO. Social History     Tobacco Use    Smoking status: Every Day     Current packs/day: 1.00     Average packs/day: 1 pack/day for 30.0 years (30.0 ttl pk-yrs)     Types: Cigarettes    Smokeless tobacco: Never   Substance Use Topics    Alcohol use: No     Family History   Problem Relation Age of Onset    Diabetes Maternal Grandmother     Diabetes Father          in early 62s, ? cause       Review of Systems  A comprehensive review of systems was negative except for what was noted in the HPI.      Recent Labs:  CBC:   Lab Results   Component Value Date/Time    WBC 13.1 2021 01:48 AM    HGB 15.9 2021 01:48 AM    HCT 46.9 2021 01:48 AM    MCH 29.2 2021 01:48 AM    MCHC 34.0 2021 01:48 AM    RDW 19.7 2021 01:48 AM     2021 01:48 AM    MPV 9.7 2021 01:48 AM     CMP:   Lab Results   Component Value Date/Time     2021 01:48 AM    K 3.7 2021 01:48 AM     2021 01:48 AM    CO2 26 2021 01:48 AM    ANIONGAP 10 2021 01:48 AM    GLUCOSE 222 2021 01:48 AM

## 2023-12-29 NOTE — TELEPHONE ENCOUNTER
Medical Facility Question      Facility Name: [facility]  Contact Name: JOSE  Contact Number: [phone]  Request or Information: REQUESTING THE [facility] QUESTIONAIRRE FILLED OUT AND FAXED TO: [phone].    [employer] CALLING BACK ON THE SAME ISSUE. PLEASE CALL THE # ABOVE FOR ASSISTANCE.

## 2024-01-02 ENCOUNTER — TELEPHONE (OUTPATIENT)
Dept: FAMILY MEDICINE CLINIC | Age: 54
End: 2024-01-02

## 2024-01-02 DIAGNOSIS — R73.9 HYPERGLYCEMIA: ICD-10-CM

## 2024-01-02 DIAGNOSIS — E78.1 PURE HYPERTRIGLYCERIDEMIA: Primary | Chronic | ICD-10-CM

## 2024-01-02 DIAGNOSIS — Z12.5 SCREENING FOR MALIGNANT NEOPLASM OF PROSTATE: ICD-10-CM

## 2024-01-02 DIAGNOSIS — E66.01 OBESITY, MORBID (MORE THAN 100 LBS OVER IDEAL WEIGHT OR BMI > 40) (HCC): ICD-10-CM

## 2024-01-02 NOTE — TELEPHONE ENCOUNTER
Patient had preop on 12/29 with Dr Lilly. Needs new labs placed due to epic update. Please place new orders and call patient to let him know they have been placed...

## 2024-01-03 DIAGNOSIS — E78.1 PURE HYPERTRIGLYCERIDEMIA: Chronic | ICD-10-CM

## 2024-01-03 DIAGNOSIS — E66.01 OBESITY, MORBID (MORE THAN 100 LBS OVER IDEAL WEIGHT OR BMI > 40) (HCC): ICD-10-CM

## 2024-01-03 DIAGNOSIS — Z12.5 SCREENING FOR MALIGNANT NEOPLASM OF PROSTATE: ICD-10-CM

## 2024-01-03 DIAGNOSIS — R73.9 HYPERGLYCEMIA: ICD-10-CM

## 2024-01-03 LAB
ALBUMIN SERPL-MCNC: 4.3 G/DL (ref 3.4–5)
ALBUMIN/GLOB SERPL: 1.5 {RATIO} (ref 1.1–2.2)
ALP SERPL-CCNC: 96 U/L (ref 40–129)
ALT SERPL-CCNC: 52 U/L (ref 10–40)
ANION GAP SERPL CALCULATED.3IONS-SCNC: 13 MMOL/L (ref 3–16)
AST SERPL-CCNC: 31 U/L (ref 15–37)
BASOPHILS # BLD: 0.1 K/UL (ref 0–0.2)
BASOPHILS NFR BLD: 0.7 %
BILIRUB SERPL-MCNC: 0.3 MG/DL (ref 0–1)
BUN SERPL-MCNC: 22 MG/DL (ref 7–20)
CALCIUM SERPL-MCNC: 8.9 MG/DL (ref 8.3–10.6)
CHLORIDE SERPL-SCNC: 102 MMOL/L (ref 99–110)
CHOLEST SERPL-MCNC: 166 MG/DL (ref 0–199)
CO2 SERPL-SCNC: 22 MMOL/L (ref 21–32)
CREAT SERPL-MCNC: 1.2 MG/DL (ref 0.9–1.3)
DEPRECATED RDW RBC AUTO: 18.6 % (ref 12.4–15.4)
EOSINOPHIL # BLD: 0.4 K/UL (ref 0–0.6)
EOSINOPHIL NFR BLD: 3 %
GFR SERPLBLD CREATININE-BSD FMLA CKD-EPI: >60 ML/MIN/{1.73_M2}
GLUCOSE P FAST SERPL-MCNC: 174 MG/DL (ref 70–99)
HCT VFR BLD AUTO: 46.3 % (ref 40.5–52.5)
HDLC SERPL-MCNC: 20 MG/DL (ref 40–60)
HGB BLD-MCNC: 15.6 G/DL (ref 13.5–17.5)
LDL CHOLESTEROL CALCULATED: 87 MG/DL
LYMPHOCYTES # BLD: 3.6 K/UL (ref 1–5.1)
LYMPHOCYTES NFR BLD: 30.6 %
MCH RBC QN AUTO: 28.6 PG (ref 26–34)
MCHC RBC AUTO-ENTMCNC: 33.7 G/DL (ref 31–36)
MCV RBC AUTO: 84.9 FL (ref 80–100)
MONOCYTES # BLD: 0.8 K/UL (ref 0–1.3)
MONOCYTES NFR BLD: 6.9 %
NEUTROPHILS # BLD: 6.8 K/UL (ref 1.7–7.7)
NEUTROPHILS NFR BLD: 58.8 %
PLATELET # BLD AUTO: 230 K/UL (ref 135–450)
PMV BLD AUTO: 10.6 FL (ref 5–10.5)
POTASSIUM SERPL-SCNC: 4.4 MMOL/L (ref 3.5–5.1)
PROT SERPL-MCNC: 7.2 G/DL (ref 6.4–8.2)
PSA SERPL DL<=0.01 NG/ML-MCNC: 1.01 NG/ML (ref 0–4)
RBC # BLD AUTO: 5.46 M/UL (ref 4.2–5.9)
SODIUM SERPL-SCNC: 137 MMOL/L (ref 136–145)
TRIGL SERPL-MCNC: 293 MG/DL (ref 0–150)
TSH SERPL DL<=0.005 MIU/L-ACNC: 3.43 UIU/ML (ref 0.27–4.2)
VLDLC SERPL CALC-MCNC: 59 MG/DL
WBC # BLD AUTO: 11.6 K/UL (ref 4–11)

## 2024-01-29 DIAGNOSIS — M54.40 CHRONIC BILATERAL LOW BACK PAIN WITH SCIATICA, SCIATICA LATERALITY UNSPECIFIED: ICD-10-CM

## 2024-01-29 DIAGNOSIS — G89.29 CHRONIC BILATERAL LOW BACK PAIN WITH SCIATICA, SCIATICA LATERALITY UNSPECIFIED: ICD-10-CM

## 2024-01-29 RX ORDER — CYCLOBENZAPRINE HCL 10 MG
TABLET ORAL
Qty: 30 TABLET | Refills: 0 | Status: SHIPPED | OUTPATIENT
Start: 2024-01-29

## 2024-01-29 NOTE — TELEPHONE ENCOUNTER
Medication:   Requested Prescriptions     Pending Prescriptions Disp Refills    cyclobenzaprine (FLEXERIL) 10 MG tablet [Pharmacy Med Name: CYCLOBENZAPRINE 10 MG TABLET] 30 tablet 0     Sig: TAKE ONE TABLET BY MOUTH ONCE NIGHTLY AS NEEDED FOR MUSCLE SPASMS          Patient Phone Number: [phone] (home)     Last appt: 12/29/2023   Next appt: 3/6/2024    Last OARRS:        No data to display              PDMP Monitoring:    Last PDMP [place] as Reviewed ([place]):  Review User Review Instant Review Result   ALTAF DIXON 10/11/2023 11:55 AM Reviewed PDMP [1]     Pref[facility] Pharmacy:   [facility] [address] [place] [address] - [address] [phone] - F [phone]  [address]  [facility] [address]  Phone: [phone] Fax: [phone]    [facility] [id number] - [place], [place] - [address] [phone] -  [phone]  [address] [place] [address]  Phone: [phone] Fax: [phone]

## 2024-03-04 NOTE — PROGRESS NOTES
Neurological:  Negative for dizziness, light-headedness and headaches.   Psychiatric/Behavioral:  Negative for dysphoric mood and sleep disturbance. The patient is not nervous/anxious.        OBJECTIVE:    /84 (Site: Left Upper Arm, Position: Sitting, Cuff Size: Large Adult)   Pulse 67   Temp 97.3 °F (36.3 °C) (Infrared)   Resp 16   Wt (!) 149 kg (328 lb 6.4 oz)   SpO2 97%   BMI 44.54 kg/m²    Physical Exam  Constitutional:       Appearance: He is well-developed. He is obese.   HENT:      Head: Normocephalic and atraumatic.      Right Ear: Tympanic membrane and external ear normal.      Left Ear: Tympanic membrane and external ear normal.      Nose: Nose normal.      Mouth/Throat:      Mouth: Mucous membranes are moist.      Pharynx: No posterior oropharyngeal erythema.   Eyes:      General:         Right eye: No discharge.      Conjunctiva/sclera: Conjunctivae normal.   Neck:      Thyroid: No thyromegaly.      Vascular: No JVD.      Trachea: No tracheal deviation.   Cardiovascular:      Rate and Rhythm: Normal rate and regular rhythm.      Heart sounds: Normal heart sounds.   Pulmonary:      Effort: Pulmonary effort is normal. No respiratory distress.      Breath sounds: Normal breath sounds. No rales.   Musculoskeletal:      Left shoulder: Decreased range of motion (Left upper extremity in a sling.).      Cervical back: Normal range of motion and neck supple.      Right lower leg: No edema.      Left lower leg: No edema.   Lymphadenopathy:      Cervical: No cervical adenopathy.   Skin:     General: Skin is warm and dry.   Neurological:      Mental Status: He is alert and oriented to person, place, and time.   Psychiatric:         Mood and Affect: Mood normal.         Behavior: Behavior normal.         ASSESSMENT/PLAN:    Jose ISAACS was seen today for hypertension.    Diagnoses and all orders for this visit:    Type 2 diabetes mellitus without complication, without long-term current use of insulin

## 2024-03-06 ENCOUNTER — OFFICE VISIT (OUTPATIENT)
Dept: FAMILY MEDICINE CLINIC | Age: 54
End: 2024-03-06
Payer: COMMERCIAL

## 2024-03-06 VITALS
SYSTOLIC BLOOD PRESSURE: 138 MMHG | TEMPERATURE: 97.3 F | WEIGHT: 315 LBS | BODY MASS INDEX: 44.54 KG/M2 | RESPIRATION RATE: 16 BRPM | DIASTOLIC BLOOD PRESSURE: 84 MMHG | OXYGEN SATURATION: 97 % | HEART RATE: 67 BPM

## 2024-03-06 DIAGNOSIS — M25.512 BILATERAL SHOULDER PAIN, UNSPECIFIED CHRONICITY: ICD-10-CM

## 2024-03-06 DIAGNOSIS — G89.29 CHRONIC BILATERAL LOW BACK PAIN WITH SCIATICA, SCIATICA LATERALITY UNSPECIFIED: ICD-10-CM

## 2024-03-06 DIAGNOSIS — M54.40 CHRONIC BILATERAL LOW BACK PAIN WITH SCIATICA, SCIATICA LATERALITY UNSPECIFIED: ICD-10-CM

## 2024-03-06 DIAGNOSIS — M25.511 BILATERAL SHOULDER PAIN, UNSPECIFIED CHRONICITY: ICD-10-CM

## 2024-03-06 DIAGNOSIS — M25.552 PAIN OF LEFT HIP: ICD-10-CM

## 2024-03-06 DIAGNOSIS — I10 PRIMARY HYPERTENSION: ICD-10-CM

## 2024-03-06 DIAGNOSIS — E11.9 TYPE 2 DIABETES MELLITUS WITHOUT COMPLICATION, WITHOUT LONG-TERM CURRENT USE OF INSULIN (HCC): Primary | ICD-10-CM

## 2024-03-06 DIAGNOSIS — E78.2 MIXED HYPERLIPIDEMIA: ICD-10-CM

## 2024-03-06 PROCEDURE — 3051F HG A1C>EQUAL 7.0%<8.0%: CPT | Performed by: FAMILY MEDICINE

## 2024-03-06 PROCEDURE — 2022F DILAT RTA XM EVC RTNOPTHY: CPT | Performed by: FAMILY MEDICINE

## 2024-03-06 PROCEDURE — 4004F PT TOBACCO SCREEN RCVD TLK: CPT | Performed by: FAMILY MEDICINE

## 2024-03-06 PROCEDURE — G8427 DOCREV CUR MEDS BY ELIG CLIN: HCPCS | Performed by: FAMILY MEDICINE

## 2024-03-06 PROCEDURE — 99214 OFFICE O/P EST MOD 30 MIN: CPT | Performed by: FAMILY MEDICINE

## 2024-03-06 PROCEDURE — 83036 HEMOGLOBIN GLYCOSYLATED A1C: CPT | Performed by: FAMILY MEDICINE

## 2024-03-06 PROCEDURE — G8484 FLU IMMUNIZE NO ADMIN: HCPCS | Performed by: FAMILY MEDICINE

## 2024-03-06 PROCEDURE — 3017F COLORECTAL CA SCREEN DOC REV: CPT | Performed by: FAMILY MEDICINE

## 2024-03-06 PROCEDURE — G8417 CALC BMI ABV UP PARAM F/U: HCPCS | Performed by: FAMILY MEDICINE

## 2024-03-06 PROCEDURE — 3075F SYST BP GE 130 - 139MM HG: CPT | Performed by: FAMILY MEDICINE

## 2024-03-06 PROCEDURE — 3079F DIAST BP 80-89 MM HG: CPT | Performed by: FAMILY MEDICINE

## 2024-03-06 RX ORDER — LISINOPRIL 20 MG/1
20 TABLET ORAL DAILY
Qty: 90 TABLET | Refills: 1 | Status: SHIPPED | OUTPATIENT
Start: 2024-03-06

## 2024-03-06 RX ORDER — METFORMIN HYDROCHLORIDE 500 MG/1
1000 TABLET, EXTENDED RELEASE ORAL
Qty: 180 TABLET | Refills: 3 | Status: SHIPPED | OUTPATIENT
Start: 2024-03-06 | End: 2025-03-01

## 2024-03-06 RX ORDER — CYCLOBENZAPRINE HCL 10 MG
TABLET ORAL
Qty: 30 TABLET | Refills: 0 | Status: SHIPPED | OUTPATIENT
Start: 2024-03-06

## 2024-03-06 ASSESSMENT — PATIENT HEALTH QUESTIONNAIRE - PHQ9
SUM OF ALL RESPONSES TO PHQ QUESTIONS 1-9: 0
1. LITTLE INTEREST OR PLEASURE IN DOING THINGS: 0
SUM OF ALL RESPONSES TO PHQ9 QUESTIONS 1 & 2: 0
SUM OF ALL RESPONSES TO PHQ QUESTIONS 1-9: 0
2. FEELING DOWN, DEPRESSED OR HOPELESS: 0
SUM OF ALL RESPONSES TO PHQ QUESTIONS 1-9: 0
SUM OF ALL RESPONSES TO PHQ QUESTIONS 1-9: 0

## 2024-03-06 ASSESSMENT — ENCOUNTER SYMPTOMS
SHORTNESS OF BREATH: 0
CONSTIPATION: 0
RHINORRHEA: 0
COUGH: 1
CHEST TIGHTNESS: 0
DIARRHEA: 0

## 2024-03-08 DIAGNOSIS — E11.9 TYPE 2 DIABETES MELLITUS WITHOUT COMPLICATION, WITHOUT LONG-TERM CURRENT USE OF INSULIN (HCC): Primary | ICD-10-CM

## 2024-03-08 RX ORDER — PIOGLITAZONEHYDROCHLORIDE 15 MG/1
15 TABLET ORAL DAILY
Qty: 90 TABLET | Refills: 3 | Status: SHIPPED | OUTPATIENT
Start: 2024-03-08 | End: 2025-03-03

## 2024-04-05 DIAGNOSIS — M54.40 CHRONIC BILATERAL LOW BACK PAIN WITH SCIATICA, SCIATICA LATERALITY UNSPECIFIED: ICD-10-CM

## 2024-04-05 DIAGNOSIS — G89.29 CHRONIC BILATERAL LOW BACK PAIN WITH SCIATICA, SCIATICA LATERALITY UNSPECIFIED: ICD-10-CM

## 2024-04-05 RX ORDER — CYCLOBENZAPRINE HCL 10 MG
TABLET ORAL
Qty: 30 TABLET | Refills: 0 | Status: SHIPPED | OUTPATIENT
Start: 2024-04-05

## 2024-04-08 ENCOUNTER — OFFICE VISIT (OUTPATIENT)
Dept: NEUROLOGY | Age: 54
End: 2024-04-08

## 2024-04-08 VITALS
HEART RATE: 77 BPM | WEIGHT: 315 LBS | SYSTOLIC BLOOD PRESSURE: 211 MMHG | DIASTOLIC BLOOD PRESSURE: 126 MMHG | BODY MASS INDEX: 44.48 KG/M2

## 2024-04-08 DIAGNOSIS — G43.019 INTRACTABLE MIGRAINE WITHOUT AURA AND WITHOUT STATUS MIGRAINOSUS: Primary | ICD-10-CM

## 2024-04-08 DIAGNOSIS — G44.86 CERVICOGENIC HEADACHE: ICD-10-CM

## 2024-04-08 DIAGNOSIS — I10 PRIMARY HYPERTENSION: Chronic | ICD-10-CM

## 2024-04-08 PROCEDURE — 99214 OFFICE O/P EST MOD 30 MIN: CPT

## 2024-04-08 PROCEDURE — 3077F SYST BP >= 140 MM HG: CPT

## 2024-04-08 PROCEDURE — 3080F DIAST BP >= 90 MM HG: CPT

## 2024-04-08 RX ORDER — TOPIRAMATE 50 MG/1
75 TABLET, FILM COATED ORAL 2 TIMES DAILY
Qty: 180 TABLET | Refills: 0 | Status: SHIPPED | OUTPATIENT
Start: 2024-04-08

## 2024-04-08 RX ORDER — DIVALPROEX SODIUM 500 MG/1
500 TABLET, EXTENDED RELEASE ORAL DAILY
Qty: 90 TABLET | Refills: 1 | Status: SHIPPED | OUTPATIENT
Start: 2024-04-08

## 2024-04-08 NOTE — PATIENT INSTRUCTIONS
YOU MUST CONFIRM YOUR APPOINTMENT 1 DAY PRIOR OR IT WILL BE CANCELLED!!   Our office will call you 3 times the day prior to your appointment in an attempt to confirm.  Please return our call ASAP or confirm your appt through [employer] no later than 3 pm the day before your appointment.  If we do not hear back from you by 3 pm to confirm, your appointment will be cancelled & someone will be added into that slot from our wait list.

## 2024-04-08 NOTE — PROGRESS NOTES
The patient came today for follow up regarding: Migraine headaches      This is my first encounter with the patient.  The patient was seen by Dr. Castillo.  I was able to review the patient's record, imaging, notes from different physicians and recent events.    Interval history:    Patient reports headache frequency is unchanged.  Still having daily headaches.  She is currently on topiramate 50 mg twice daily and Depakote 500 mg daily.  He reports adequate hydration.  He reports poor hygiene, he reports staying up at night.  He is also 1 pack-a-day tobacco smoker.  Headache description posterior headaches, sharp shooting pain, starting in his neck shooting up over his head.  Moderately severe.  He denies radiculopathy symptoms.  He does have history of low back pain.  Recently had left shoulder surgery.  He has not followed up on sleep study.  Blood sugar in office today is elevated, systolic 200s.  Other review of systems unremarkable      Background history:    Patient with known history of migraine headaches.  With patient reported having daily headaches.  He is has been woken up by such headaches.  Description dull, severe headaches, behind his eyes going up into his head and neck region. The headaches are not made worse by light or loud noise.  No visual symptoms with headache. There was concern for hypersomnia in the past and he was scheduled for a sleep study but patient states that the symptoms resolved on their own and he never proceeded with the sleep study.  He states that he does not snore at night and sleeps well now.  He feels rested in the mornings unless he has a headache.  Patient had MRI brain and it was normal. He was started on topiramate 50 mg twice daily.  His headaches were not controlled, he was started on Depakote 500 mg daily.          Exam:   Constitutional:   Vitals:    04/08/24 1402   BP: (!) 211/126   Pulse: 77   Weight: (!) 148.8 kg (328 lb)       General appearance:  Normal development

## 2024-05-01 DIAGNOSIS — M25.552 PAIN OF LEFT HIP: ICD-10-CM

## 2024-05-01 DIAGNOSIS — M25.511 BILATERAL SHOULDER PAIN, UNSPECIFIED CHRONICITY: ICD-10-CM

## 2024-05-01 DIAGNOSIS — M54.40 CHRONIC BILATERAL LOW BACK PAIN WITH SCIATICA, SCIATICA LATERALITY UNSPECIFIED: ICD-10-CM

## 2024-05-01 DIAGNOSIS — G89.29 CHRONIC BILATERAL LOW BACK PAIN WITH SCIATICA, SCIATICA LATERALITY UNSPECIFIED: ICD-10-CM

## 2024-05-01 DIAGNOSIS — M25.512 BILATERAL SHOULDER PAIN, UNSPECIFIED CHRONICITY: ICD-10-CM

## 2024-05-01 RX ORDER — CYCLOBENZAPRINE HCL 10 MG
TABLET ORAL
Qty: 30 TABLET | Refills: 0 | Status: SHIPPED | OUTPATIENT
Start: 2024-05-01

## 2024-06-05 DIAGNOSIS — G89.29 CHRONIC BILATERAL LOW BACK PAIN WITH SCIATICA, SCIATICA LATERALITY UNSPECIFIED: ICD-10-CM

## 2024-06-05 DIAGNOSIS — M54.40 CHRONIC BILATERAL LOW BACK PAIN WITH SCIATICA, SCIATICA LATERALITY UNSPECIFIED: ICD-10-CM

## 2024-06-06 RX ORDER — CYCLOBENZAPRINE HCL 10 MG
TABLET ORAL
Qty: 30 TABLET | Refills: 0 | Status: SHIPPED | OUTPATIENT
Start: 2024-06-06

## 2024-06-24 DIAGNOSIS — G62.9 NEUROPATHY: ICD-10-CM

## 2024-06-24 RX ORDER — GABAPENTIN 300 MG/1
CAPSULE ORAL
Qty: 30 CAPSULE | Refills: 0 | Status: SHIPPED | OUTPATIENT
Start: 2024-06-24 | End: 2024-10-14

## 2024-07-16 DIAGNOSIS — G62.9 NEUROPATHY: ICD-10-CM
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
## 2024-07-17 RX ORDER — GABAPENTIN 300 MG/1
CAPSULE ORAL
Qty: 30 CAPSULE | Refills: 0 | Status: SHIPPED | OUTPATIENT
Start: 2024-07-17 | End: 2024-07-19

## 2024-07-19 DIAGNOSIS — G62.9 NEUROPATHY: ICD-10-CM

## 2024-07-19 RX ORDER — GABAPENTIN 300 MG/1
CAPSULE ORAL
Qty: 90 CAPSULE | Refills: 2 | Status: SHIPPED | OUTPATIENT
Start: 2024-07-19 | End: 2024-11-07

## 2024-07-19 NOTE — TELEPHONE ENCOUNTER
Patient called stating pharmacy is having trouble filling his medication as they need clarification .     Medication is typically filled with QTY ( 90 ) , Please call pharmacy to correct qty   If approved . ( Patient has been out of medication for almost 4 days )     gabapentin (NEURONTIN) 300 MG capsule [[id number]]    Order Details  Dose, Route, Frequency: As Directed   Dispense Quantity: 30 capsule Refills: 0          Sig: TAKE ONE CAPSULE BY MOUTH THREE TIMES A DAY   Pharmacy    [facility] [id number] - [place], [place] - [facility] [address] [phone] - F [phone]  [address] [facility] [address]  Phone: [phone]  Fax: [phone]       Thank You   Zehra

## 2024-08-22 DIAGNOSIS — M54.40 CHRONIC BILATERAL LOW BACK PAIN WITH SCIATICA, SCIATICA LATERALITY UNSPECIFIED: ICD-10-CM

## 2024-08-22 DIAGNOSIS — G89.29 CHRONIC BILATERAL LOW BACK PAIN WITH SCIATICA, SCIATICA LATERALITY UNSPECIFIED: ICD-10-CM

## 2024-08-23 RX ORDER — CYCLOBENZAPRINE HCL 10 MG
TABLET ORAL
Qty: 30 TABLET | Refills: 0 | Status: SHIPPED | OUTPATIENT
Start: 2024-08-23

## 2024-09-23 ENCOUNTER — TELEPHONE (OUTPATIENT)
Dept: FAMILY MEDICINE CLINIC | Age: 54
End: 2024-09-23

## 2024-11-06 ENCOUNTER — TELEPHONE (OUTPATIENT)
Dept: FAMILY MEDICINE CLINIC | Age: 54
End: 2024-11-06

## 2024-11-06 DIAGNOSIS — G62.9 NEUROPATHY: Primary | ICD-10-CM

## 2024-11-06 NOTE — TELEPHONE ENCOUNTER
Patient called and states that the gabapentin he has been taking for the neuropathy in his feet is no longer working. He would like to know if he could get something stronger.    Please advise    [facility] [id number] - [place], [place] - [address] - P [phone] - F [phone]  [address] [facility] [address]  Phone: [phone]  Fax: [phone]

## 2024-11-12 DIAGNOSIS — G62.9 NEUROPATHY: ICD-10-CM

## 2024-11-12 RX ORDER — GABAPENTIN 300 MG/1
CAPSULE ORAL
Qty: 90 CAPSULE | Refills: 2 | OUTPATIENT
Start: 2024-11-12

## 2024-11-13 DIAGNOSIS — G62.9 NEUROPATHY: ICD-10-CM

## 2024-11-13 DIAGNOSIS — I10 PRIMARY HYPERTENSION: ICD-10-CM

## 2024-11-13 RX ORDER — GABAPENTIN 300 MG/1
CAPSULE ORAL
Qty: 90 CAPSULE | Refills: 2 | OUTPATIENT
Start: 2024-11-13 | End: 2025-03-04

## 2024-11-13 RX ORDER — LISINOPRIL 20 MG/1
20 TABLET ORAL DAILY
Qty: 90 TABLET | Refills: 0 | OUTPATIENT
Start: 2024-11-13